# Patient Record
Sex: FEMALE | Race: WHITE | NOT HISPANIC OR LATINO | Employment: STUDENT | URBAN - METROPOLITAN AREA
[De-identification: names, ages, dates, MRNs, and addresses within clinical notes are randomized per-mention and may not be internally consistent; named-entity substitution may affect disease eponyms.]

---

## 2024-03-15 NOTE — PROGRESS NOTES
Assessment/Plan:    IUD removed. Pt has already started PNV. Will call with pos PT.   Preconception counseling done. Recommended monthly SBE and annual CBE. ASCCP guidelines reviewed and no pap collected today. Reviewed diet/activity recommendations. Return to the office in one year for routine annual gyn exam or sooner PRN.    Diagnoses and all orders for this visit:    Encounter for gynecological examination (general) (routine) without abnormal findings    Other orders  -     Iud removal        Subjective:      Patient ID: Sherrie Saucedo is a 29 y.o. female.    This patient presents for routine annual gyn exam.  She denies acute gyn complaints. She had Kyleena IUD placed 4/12/21 at [facility]. At last visit, strings were cut because her partner was getting cut by strings. She was made aware that removal would be more difficult. TVU at that time showed correct placement. Today, pt states she would like IUD removed as she and her  would like to start a family.   She denies breast concerns, abn discharge, bowel/bladder dysfunction, depression/anxiety.   Last pap normal 3/29/22.  + gardasil          The following portions of the patient's history were reviewed and updated as appropriate: allergies, current medications, past family history, past medical history, past social history, past surgical history and problem list.    Review of Systems   Constitutional: Negative.    HENT: Negative.     Respiratory: Negative.     Cardiovascular: Negative.    Gastrointestinal: Negative.    Endocrine: Negative.    Genitourinary:  Negative for difficulty urinating, dyspareunia, dysuria, frequency, menstrual problem, pelvic pain, urgency, vaginal bleeding, vaginal discharge and vaginal pain.   Musculoskeletal: Negative.    Skin: Negative.    Neurological: Negative.    Psychiatric/Behavioral: Negative.           Objective:      /70   Pulse 97   Wt 61.1 kg (134 lb 9.6 oz)   LMP 03/04/2024   BMI 24.62 kg/m²          Physical  Exam  Vitals and nursing note reviewed. Exam conducted with a chaperone present.   Constitutional:       Appearance: Normal appearance. She is well-developed.   HENT:      Head: Normocephalic and atraumatic.   Neck:      Thyroid: No thyroid mass or thyromegaly.   Cardiovascular:      Rate and Rhythm: Normal rate and regular rhythm.      Heart sounds: Normal heart sounds.   Pulmonary:      Effort: Pulmonary effort is normal.      Breath sounds: Normal breath sounds.   Chest:   Breasts:     Breasts are symmetrical.      Right: No inverted nipple, mass, nipple discharge, skin change or tenderness.      Left: No inverted nipple, mass, nipple discharge, skin change or tenderness.   Abdominal:      General: Bowel sounds are normal.      Palpations: Abdomen is soft.      Tenderness: There is no abdominal tenderness.      Hernia: There is no hernia in the left inguinal area or right inguinal area.   Genitourinary:     General: Normal vulva.      Exam position: Supine.      Pubic Area: No rash.       Labia:         Right: No rash, tenderness, lesion or injury.         Left: No rash, tenderness, lesion or injury.       Urethra: No prolapse, urethral pain, urethral swelling or urethral lesion.      Vagina: Normal. No signs of injury and foreign body. No vaginal discharge, erythema, tenderness, bleeding, lesions or prolapsed vaginal walls.      Cervix: No cervical motion tenderness, discharge, friability, lesion, erythema, cervical bleeding or eversion.      Uterus: Not deviated, not enlarged, not fixed, not tender and no uterine prolapse.       Adnexa:         Right: No mass, tenderness or fullness.          Left: No mass, tenderness or fullness.        Rectum: No external hemorrhoid.      Comments: Urethra normal without lesions  No bladder tenderness  Musculoskeletal:         General: Normal range of motion.      Cervical back: Normal range of motion and neck supple.   Lymphadenopathy:      Lower Body: No right inguinal  adenopathy. No left inguinal adenopathy.   Skin:     General: Skin is warm and dry.   Neurological:      Mental Status: She is alert and oriented to person, place, and time.   Psychiatric:         Speech: Speech normal.         Behavior: Behavior normal. Behavior is cooperative.       Iud removal    Date/Time: 3/22/2024 8:30 AM    Performed by: BRIONNA Ramsey  Authorized by: BRIONNA Ramsey  Universal Protocol:  Consent: Verbal consent obtained. Written consent obtained.  Risks and benefits: risks, benefits and alternatives were discussed (infection, bleeding, pain, injury to surrounding structures, failure to remove)  Timeout called at: 3/22/2024 8:30 AM.  Patient understanding: patient states understanding of the procedure being performed  Patient consent: the patient's understanding of the procedure matches consent given  Procedure consent: procedure consent matches procedure scheduled  Relevant documents: relevant documents present and verified  Required items: required blood products, implants, devices, and special equipment available  Patient identity confirmed: verbally with patient    Procedure:     Removed with no complications: yes      Other reason for removal:  Would like to start family

## 2024-03-22 ENCOUNTER — ANNUAL EXAM (OUTPATIENT)
Dept: GYNECOLOGY | Facility: CLINIC | Age: 29
End: 2024-03-22
Payer: COMMERCIAL

## 2024-03-22 VITALS
WEIGHT: 134.6 LBS | DIASTOLIC BLOOD PRESSURE: 70 MMHG | HEART RATE: 97 BPM | BODY MASS INDEX: 24.62 KG/M2 | SYSTOLIC BLOOD PRESSURE: 118 MMHG

## 2024-03-22 DIAGNOSIS — Z30.432 ENCOUNTER FOR IUD REMOVAL: ICD-10-CM

## 2024-03-22 DIAGNOSIS — Z01.419 ENCOUNTER FOR GYNECOLOGICAL EXAMINATION (GENERAL) (ROUTINE) WITHOUT ABNORMAL FINDINGS: Primary | ICD-10-CM

## 2024-03-22 PROCEDURE — S0612 ANNUAL GYNECOLOGICAL EXAMINA: HCPCS | Performed by: OBSTETRICS & GYNECOLOGY

## 2024-03-22 PROCEDURE — 58301 REMOVE INTRAUTERINE DEVICE: CPT | Performed by: OBSTETRICS & GYNECOLOGY

## 2024-06-15 DIAGNOSIS — Z00.6 ENCOUNTER FOR EXAMINATION FOR NORMAL COMPARISON OR CONTROL IN CLINICAL RESEARCH PROGRAM: ICD-10-CM

## 2024-06-22 ENCOUNTER — APPOINTMENT (OUTPATIENT)
Dept: LAB | Facility: CLINIC | Age: 29
End: 2024-06-22

## 2024-06-22 DIAGNOSIS — Z00.6 ENCOUNTER FOR EXAMINATION FOR NORMAL COMPARISON OR CONTROL IN CLINICAL RESEARCH PROGRAM: ICD-10-CM

## 2024-06-22 PROCEDURE — 36415 COLL VENOUS BLD VENIPUNCTURE: CPT

## 2024-06-26 ENCOUNTER — TELEPHONE (OUTPATIENT)
Dept: FAMILY MEDICINE CLINIC | Facility: CLINIC | Age: 29
End: 2024-06-26

## 2024-06-30 NOTE — TELEPHONE ENCOUNTER
06/30/24 12:37 AM        The office's request has been received, reviewed, and the patient chart updated. The PCP has successfully been removed with a patient attribution note. This message will now be completed.        Thank you  Hilario Can

## 2024-07-05 LAB
APOB+LDLR+PCSK9 GENE MUT ANL BLD/T: NOT DETECTED
BRCA1+BRCA2 DEL+DUP + FULL MUT ANL BLD/T: NOT DETECTED
MLH1+MSH2+MSH6+PMS2 GN DEL+DUP+FUL M: NOT DETECTED

## 2024-08-21 ENCOUNTER — ULTRASOUND (OUTPATIENT)
Age: 29
End: 2024-08-21
Payer: COMMERCIAL

## 2024-08-21 VITALS — WEIGHT: 132 LBS | DIASTOLIC BLOOD PRESSURE: 62 MMHG | SYSTOLIC BLOOD PRESSURE: 118 MMHG | BODY MASS INDEX: 24.14 KG/M2

## 2024-08-21 DIAGNOSIS — N91.1 SECONDARY AMENORRHEA: Primary | ICD-10-CM

## 2024-08-21 PROCEDURE — 76817 TRANSVAGINAL US OBSTETRIC: CPT | Performed by: OBSTETRICS & GYNECOLOGY

## 2024-08-21 PROCEDURE — 99214 OFFICE O/P EST MOD 30 MIN: CPT | Performed by: OBSTETRICS & GYNECOLOGY

## 2024-08-21 NOTE — PROGRESS NOTES
Assessment/Plan:      Secondary amenorrhea  -     Ambulatory Referral to Maternal Fetal Medicine; Future  -     AMB US OB < 14 weeks single or first gestation level 1  -     SIUP @ 8w 4d by LMP EDC 3/29/2025        Santos Saucedo is a 29 y.o.  LMP 2024 who presents with amenorrhea and + urine hCG.  Some mild nausea.  No VB.    Cycle length: regular 28d.  Pregnancy testing: at home.  Pregnancy imaging: not done.  Blood type: B positive.  Other lab results: none.    Past Medical History:   Diagnosis Date    Abnormal Pap smear of cervix 3/29/21    HPV OTHER-positive, 16/18 negative    Asthma        Family History   Problem Relation Age of Onset    Vaginal cancer Paternal Aunt     Cancer Maternal Grandmother         Lung cancer       The following portions of the patient's history were reviewed and updated as appropriate: allergies, current medications, past family history, past medical history, past social history, past surgical history, and problem list.    Review of Systems  Pertinent items are noted in HPI.     Objective      /62   Wt 59.9 kg (132 lb)   LMP 2024   BMI 24.14 kg/m²     General: alert and oriented, in no acute distress   Heart: regular rate and rhythm   Lungs: Effort normal   Abdomen: soft, non-tender, without masses or organomegaly   Vulva: normal                     Imaging:  see report under OB Procedures

## 2024-08-22 ENCOUNTER — TELEPHONE (OUTPATIENT)
Age: 29
End: 2024-08-22

## 2024-08-22 NOTE — PROGRESS NOTES
Ultrasound Probe Disinfection    A transvaginal ultrasound was performed.   Prior to use, disinfection was performed with High Level Disinfection Process (Trophon)  Probe serial number  [id number]  was used    María Pantoja MA  08/22/24  2:30 PM

## 2024-09-05 NOTE — PROGRESS NOTES
OB INTAKE INTERVIEW 2024    Patient is 29 y.o. who presents for OB intake at 10w6d.  She is accompanied by her spouse during this encounter.  The father of her baby (Sathish Saucedo) is involved in the pregnancy.      Patient's last menstrual period was 2024.  Ultrasound: Measured 9 weeks 0 days on 2024  Estimated Date of Delivery: 3/29/25 confirmed by dating ultrasound.    Signs/Symptoms of Pregnancy  Current pregnancy symptoms: fatigue and nausea  Headaches: YES - relieved w/rest  Cramping: YES - occasional  Spotting: no  PICA cravings: no    Diabetes:  Body mass index is 24.62 kg/m².  If patient has 1 or more, please order early 1 hour GTT  History of GDM: no  BMI >35 no  History of PCOS or current metformin use: no  History of LGA/macrosomic infant (4000g/9lbs): no    If patient has 2 or more, please order early 1 hour GTT  BMI>30 no  AMA: no  First degree relative with type 2 diabetes: no  History of chronic HTN, hyperlipidemia, elevated A1C: no  High risk race (, , ,  or ): no    Hypertension: if you answer yes to any of the following, please order baseline preeclampsia labs (cbc, comprehensive metabolic panel, urine protein creatinine ratio, uric acid)  History of of chronic HTN: no  History of gestational HTN: no  History of preeclampsia, eclampsia, or HELLP syndrome: no  History of diabetes: no  History of lupus, autoimmune disease, kidney disease: no    Thyroid: if yes order TSH with reflex T4  History of thyroid disease: no    Bleeding Disorder or Hx of DVT - patient or first degree relative with history of. Order the following if not done previously.   (Factor V, antithrombin III, prothrombin gene mutation, protein C and S Ag, lupus anticoagulant, anticardiolipin, beta-2 glycoprotein):   no    OB/GYN:  History of abnormal pap smear: YES - ASCUS/HPV in the past  Date of last pap smear: 2022  History of HPV: YES - in the  past  History of Herpes/HSV: no  History of other STI: (gonorrhea, chlamydia, trich) no  History of prior : no  History of prior : no  History of  delivery prior to 36 weeks 6 days: no  History of blood transfusion: no  Ok for blood transfusion: YES    Substance screening:   History of tobacco use: no  Currently using tobacco: no  Currently using alcohol: no  Presently using drugs: no  Past drug use:  no  IV drug use: no  Partner drug use: no  Parent/Family drug use: no  Substance Use Screen: Level  No risk    MRSA Screening:   Does the pt have a hx of MRSA? no    Mental Health:  Hx of/or current dx of depression: no  Hx of/or current dx of anxiety: no  Medications: no   EPDS Screen:  Negative / score: 2    Immunizations:  Discussed Tdap vaccine:  YES  Discussed COVID Vaccine:  YES -had in the past    Genetic/MFM:  Do you or your partner have a history of any of the following in yourselves or first degree relatives?  Cystic fibrosis: no  Spinal muscular atrophy: no  Hemoglobinopathy/Sickle Cell/Thalassemia: no  Fragile X Intellectual Disability: no    If yes, discuss Carrier Screening and recommend consultation with MFM/Genetic Counseling and place specific [facility] Referral placed    Discussed Carrier Screening being completed once in a lifetime as a standard of care lab test. Place orders for Cystic Fibrosis Gene Test ([id number]) and Spinal Muscular Atrophy DNA ([id number]).  Patient was informed that prior authorization needs to be completed for these tests and this may take 7-10 business days.  Patient does desire testing for Cystic Fibrosis and Spinal Muscular Atrophy.    ACOG Patient Education Cystic Fibrosis and Spinal Muscular Atrophy prenatal screening given.    Appointment for Nuchal Translucency Ultrasound at [facility] is scheduled for 24.    Interview education:  [facility] Pregnancy Essentials Book reviewed, discussed and attached to their AVS: YES     Nurse/Family Partnership-patient may qualify  NO; referral placed NO     Prenatal lab work scripts: YES    Extra labs ordered: Cystic Fibrosis gene test, Spinal muscular atrophy DNA, and Hgb Fractionation Cascade    Aspirin/Preeclampsia Screen    Risk Level Risk Factor Recommendation   LOW Prior Uncomplicated full-term delivery: no No Aspirin recommendation        MODERATE Nulliparity YES Recommend low-dose aspirin if     BMI>30 no 2 or more moderate risk factors    Family History Preeclampsia (mother/sister) no     35yr old or greater: no      or Low Socioeconomic: no     IVF Pregnancy:  no     Personal History Risks (low birth weight, prior adverse preg outcome, >10yr preg interval): no         HIGH History of Preeclampsia: no Recommend low-dose aspirin if     Multifetal gestation: no 1 or more high risk factors    Chronic HTN: no     Type 1 or 2 Diabetes: no     Renal Disease: no     Autoimmune Disease:  no      Contraindications to ASA therapy:  NSAID/ ASA allergy: no  Nasal polyps: no  Asthma with history of ASA induced bronchospasm: no    Relative contraindications:  History of GI bleed: no  Active peptic ulcer disease: no  Severe hepatic dysfunction: no    Patient does not meet recommendation to take ASA 162mg during this pregnancy from 12-36 wks to lower her risk of preeclampsia.      The patient has a history now or in prior pregnancy notable for:  pt adopted, hx asthma       Details that I feel the provider should be aware of: Sherrie was seen here in office for her OB Intake visit, Hx obtained, c/o occasional nausea, discussed B6/Unisom dosing, also reviewed avoiding an empty belly.  Reviewed medications safe to take in pregnancy.  [facility] Essentials packet/link reviewed. [facility] Baby and Me classes reviewed and how to register for classes     PN1 visit scheduled. The patient was oriented to our practice, the navigator role, reviewed delivering physicians and [facility] for delivery. All questions were answered.    Interviewed by:  Kim Nicolas RN

## 2024-09-05 NOTE — PATIENT INSTRUCTIONS
Congratulations on your pregnancy!  We thank you for allowing us to participate in your care.    NEXT STEPS    Go to the lab to have your prenatal bloodwork competed if you have not already done so.  There is a listing of [facility] and locations in your prenatal folder. You may also visit [email or web address] or call [phone].   Please be aware that some insurance companies may require you to go to a specific lab (ex. [employer] or [employer]). You can verify this by contacting your insurance company.   If you have decided to be screened for CF and SMA genetic testing, these tests require prior authorization and scheduling.  Prior Authorization is not a guarantee of payment. There may be out of pocket expenses that includes copays, deductibles and or coinsurance for your individual plan.  Please call [phone] if our team has not contacted you in 7 business days.  Please have your blood work completed prior to you next prenatal visit.    If you have decided to have genetic testing done at Maternal Fetal Medicine, that will be scheduled by [facility]. You may have already scheduled this appointment.  If not, please call their office to schedule this appointment.  Based on the referral placed by our office, they will know how to schedule you appropriately.    Contact information for Maternal Fetal Medicine is located in your prenatal folder. The main phone number to their office is [phone]..     Return to our office for your first routine prenatal visit.     Warning Signs During Pregnancy - If you experience any problems or concerns, call the office directly.  The list below includes warning signs your providers would like you to be aware of.  If you experience any of these at any time during your pregnancy, please call us as soon as possible.    Vaginal bleeding   Sharp abdominal pain that does not go away   Fever (more than 100.4?F and is not relieved with Tylenol)   Persistent vomiting lasting greater than 24  hours   Chest pain/Shortness of breath   Pain or burning when you urinate     Call the OFFICE [phone] for any questions/emergencies.  At night or on the weekend, calls go through a triage service, please indicate it is an emergency and the DOCTOR on call will be paged.    Remember to only use MyChart for non-urgent concerns or questions.    Our doctors deliver at [facility] in [place]. The address is provided below.     Critical access hospital  [address] [place]  [address]     Please click on the links below to review our Pregnancy Essential Guide.    [facility] Pregnancy Essentials Guide  [facility] Women's Health ([email or web address])     [facility] - Virtual Tour ([email or web address])      Click on the link below to review [facility] Lab locations.  [facility] Lab Locations    [employer] Apps resource  [employer] is a tool to connect you to community resources you may need.      Thank you,   Kim HALEY, RN  OB Nurse Navigator

## 2024-09-06 ENCOUNTER — INITIAL PRENATAL (OUTPATIENT)
Age: 29
End: 2024-09-06

## 2024-09-06 VITALS
DIASTOLIC BLOOD PRESSURE: 64 MMHG | WEIGHT: 134.6 LBS | SYSTOLIC BLOOD PRESSURE: 112 MMHG | BODY MASS INDEX: 24.77 KG/M2 | HEIGHT: 62 IN

## 2024-09-06 DIAGNOSIS — Z31.430 ENCOUNTER OF FEMALE FOR TESTING FOR GENETIC DISEASE CARRIER STATUS FOR PROCREATIVE MANAGEMENT: ICD-10-CM

## 2024-09-06 DIAGNOSIS — Z34.01 ENCOUNTER FOR SUPERVISION OF NORMAL FIRST PREGNANCY IN FIRST TRIMESTER: Primary | ICD-10-CM

## 2024-09-06 PROCEDURE — OBC

## 2024-09-13 ENCOUNTER — APPOINTMENT (OUTPATIENT)
Dept: LAB | Facility: CLINIC | Age: 29
End: 2024-09-13
Payer: COMMERCIAL

## 2024-09-13 DIAGNOSIS — Z31.430 ENCOUNTER OF FEMALE FOR TESTING FOR GENETIC DISEASE CARRIER STATUS FOR PROCREATIVE MANAGEMENT: ICD-10-CM

## 2024-09-13 DIAGNOSIS — Z34.01 ENCOUNTER FOR SUPERVISION OF NORMAL FIRST PREGNANCY IN FIRST TRIMESTER: ICD-10-CM

## 2024-09-13 LAB
ABO GROUP BLD: NORMAL
BASOPHILS # BLD AUTO: 0.03 THOUSANDS/ΜL (ref 0–0.1)
BASOPHILS NFR BLD AUTO: 0 % (ref 0–1)
BILIRUB UR QL STRIP: NEGATIVE
BLD GP AB SCN SERPL QL: NEGATIVE
CLARITY UR: CLEAR
COLOR UR: NORMAL
EOSINOPHIL # BLD AUTO: 0.11 THOUSAND/ΜL (ref 0–0.61)
EOSINOPHIL NFR BLD AUTO: 1 % (ref 0–6)
ERYTHROCYTE [DISTWIDTH] IN BLOOD BY AUTOMATED COUNT: 12.9 % (ref 11.6–15.1)
GLUCOSE UR STRIP-MCNC: NEGATIVE MG/DL
HBV SURFACE AB SER-ACNC: <3 MIU/ML
HBV SURFACE AG SER QL: NORMAL
HCT VFR BLD AUTO: 33.9 % (ref 34.8–46.1)
HCV AB SER QL: NORMAL
HGB BLD-MCNC: 11.6 G/DL (ref 11.5–15.4)
HGB UR QL STRIP.AUTO: NEGATIVE
HIV 1+2 AB+HIV1 P24 AG SERPL QL IA: NORMAL
HIV 2 AB SERPL QL IA: NORMAL
HIV1 AB SERPL QL IA: NORMAL
HIV1 P24 AG SERPL QL IA: NORMAL
IMM GRANULOCYTES # BLD AUTO: 0.02 THOUSAND/UL (ref 0–0.2)
IMM GRANULOCYTES NFR BLD AUTO: 0 % (ref 0–2)
KETONES UR STRIP-MCNC: NEGATIVE MG/DL
LEUKOCYTE ESTERASE UR QL STRIP: NEGATIVE
LYMPHOCYTES # BLD AUTO: 2.47 THOUSANDS/ΜL (ref 0.6–4.47)
LYMPHOCYTES NFR BLD AUTO: 30 % (ref 14–44)
MCH RBC QN AUTO: 30.1 PG (ref 26.8–34.3)
MCHC RBC AUTO-ENTMCNC: 34.2 G/DL (ref 31.4–37.4)
MCV RBC AUTO: 88 FL (ref 82–98)
MONOCYTES # BLD AUTO: 0.31 THOUSAND/ΜL (ref 0.17–1.22)
MONOCYTES NFR BLD AUTO: 4 % (ref 4–12)
NEUTROPHILS # BLD AUTO: 5.27 THOUSANDS/ΜL (ref 1.85–7.62)
NEUTS SEG NFR BLD AUTO: 65 % (ref 43–75)
NITRITE UR QL STRIP: NEGATIVE
NRBC BLD AUTO-RTO: 0 /100 WBCS
PH UR STRIP.AUTO: 6.5 [PH]
PLATELET # BLD AUTO: 362 THOUSANDS/UL (ref 149–390)
PMV BLD AUTO: 9 FL (ref 8.9–12.7)
PROT UR STRIP-MCNC: NEGATIVE MG/DL
RBC # BLD AUTO: 3.86 MILLION/UL (ref 3.81–5.12)
RH BLD: POSITIVE
RUBV IGG SERPL IA-ACNC: 16.7 IU/ML
SP GR UR STRIP.AUTO: 1.01 (ref 1–1.03)
SPECIMEN EXPIRATION DATE: NORMAL
TREPONEMA PALLIDUM IGG+IGM AB [PRESENCE] IN SERUM OR PLASMA BY IMMUNOASSAY: NORMAL
UROBILINOGEN UR STRIP-ACNC: <2 MG/DL
WBC # BLD AUTO: 8.21 THOUSAND/UL (ref 4.31–10.16)

## 2024-09-13 PROCEDURE — 86900 BLOOD TYPING SEROLOGIC ABO: CPT

## 2024-09-13 PROCEDURE — 86803 HEPATITIS C AB TEST: CPT

## 2024-09-13 PROCEDURE — 86850 RBC ANTIBODY SCREEN: CPT

## 2024-09-13 PROCEDURE — 83020 HEMOGLOBIN ELECTROPHORESIS: CPT

## 2024-09-13 PROCEDURE — 81220 CFTR GENE COM VARIANTS: CPT

## 2024-09-13 PROCEDURE — 87389 HIV-1 AG W/HIV-1&-2 AB AG IA: CPT

## 2024-09-13 PROCEDURE — 87340 HEPATITIS B SURFACE AG IA: CPT

## 2024-09-13 PROCEDURE — 85025 COMPLETE CBC W/AUTO DIFF WBC: CPT

## 2024-09-13 PROCEDURE — 86780 TREPONEMA PALLIDUM: CPT

## 2024-09-13 PROCEDURE — 36415 COLL VENOUS BLD VENIPUNCTURE: CPT

## 2024-09-13 PROCEDURE — 87086 URINE CULTURE/COLONY COUNT: CPT

## 2024-09-13 PROCEDURE — 86901 BLOOD TYPING SEROLOGIC RH(D): CPT

## 2024-09-13 PROCEDURE — 86706 HEP B SURFACE ANTIBODY: CPT

## 2024-09-13 PROCEDURE — 81329 SMN1 GENE DOS/DELETION ALYS: CPT

## 2024-09-13 PROCEDURE — 81003 URINALYSIS AUTO W/O SCOPE: CPT

## 2024-09-13 PROCEDURE — 86762 RUBELLA ANTIBODY: CPT

## 2024-09-14 LAB — BACTERIA UR CULT: NORMAL

## 2024-09-16 NOTE — PATIENT INSTRUCTIONS
You elected to have non-invasive prenatal screening (NIPS). This involves a blood test to check for the four most common genetic syndromes (Trisomy 21, Trisomy 13, Trisomy 18, and sex chromosome abnormalities). It also MAY report the biologic sex of the fetus if you opted to learn this information. You can call our office to verbally review results to avoid inadvertently learning this information via [employer], if desired. Results will be visible in your [employer] portal 5-7 business days from when the test is drawn. Please follow all instructions regarding insurance cost/coverage provided to you today. Please contact our office with any concerns or questions. You will need spina bifida screening (called MSAFP) for the baby beginning at 15 weeks gestation, which will be ordered by your obstetrician's office. This test allows for earlier detection of spina bifida than is possible by ultrasound, and is advised in all pregnancies.            Thank you for choosing us for your  care today.  If you have any questions about your ultrasound or care, please do not hesitate to contact us or your primary obstetrician.        Some general instructions for your pregnancy are:    Exercise: Aim for 150 minutes per week of regular exercise.  Walking is great!  Nutrition: Choose healthy sources of calcium, iron, and protein.  Avoid ultraprocessed foods and added sugar.  Learn about Preeclampsia: preeclampsia is a common, potentially serious high blood pressure complication in pregnancy.  A blood pressure of 140mmHg (systolic or top number) or 90mmHg (diastolic or bottom number) should be evaluated by your doctor.  Aspirin is sometimes prescribed in early pregnancy to prevent preeclampsia in women with risk factors - ask your obstetrician if you should be on this medication.  For more resources, visit:  [email or web address].[email or web address]  If you smoke, please try to quit completely but also try to reduce your  smoking by as much as possible (as soon as possible).  Do not vape.  Please also avoid cannabis products.  Other warning signs to watch out for in pregnancy or postpartum: chest pain, obstructed breathing or shortness of breath, seizures, thoughts of hurting yourself or your baby, bleeding, a painful or swollen leg, fever, or headache (see [facility] POST-BIRTH Warning Signs campaign).  If these happen call 911.  Itching is also not normal in pregnancy and if you experience this, especially over your hands and feet, potentially worse at night, notify your doctors.

## 2024-09-17 ENCOUNTER — ROUTINE PRENATAL (OUTPATIENT)
Dept: PERINATAL CARE | Facility: CLINIC | Age: 29
End: 2024-09-17
Payer: COMMERCIAL

## 2024-09-17 VITALS
SYSTOLIC BLOOD PRESSURE: 108 MMHG | BODY MASS INDEX: 24.92 KG/M2 | WEIGHT: 135.4 LBS | HEIGHT: 62 IN | HEART RATE: 92 BPM | DIASTOLIC BLOOD PRESSURE: 64 MMHG

## 2024-09-17 DIAGNOSIS — Z33.1 PREGNANT STATE, INCIDENTAL: ICD-10-CM

## 2024-09-17 DIAGNOSIS — Z3A.12 12 WEEKS GESTATION OF PREGNANCY: Primary | ICD-10-CM

## 2024-09-17 DIAGNOSIS — Z36.9 UNSPECIFIED ANTENATAL SCREENING: ICD-10-CM

## 2024-09-17 DIAGNOSIS — O36.80X0 ENCOUNTER TO DETERMINE FETAL VIABILITY OF PREGNANCY, SINGLE OR UNSPECIFIED FETUS: ICD-10-CM

## 2024-09-17 DIAGNOSIS — N91.1 SECONDARY AMENORRHEA: ICD-10-CM

## 2024-09-17 DIAGNOSIS — Z36.82 ENCOUNTER FOR NUCHAL TRANSLUCENCY TESTING: ICD-10-CM

## 2024-09-17 LAB
HGB A MFR BLD: 2.7 % (ref 1.8–3.2)
HGB A MFR BLD: 97.3 % (ref 96.4–98.8)
HGB F MFR BLD: 0 % (ref 0–2)
HGB FRACT BLD-IMP: NORMAL
HGB S MFR BLD: 0 %

## 2024-09-17 PROCEDURE — 76801 OB US < 14 WKS SINGLE FETUS: CPT | Performed by: OBSTETRICS & GYNECOLOGY

## 2024-09-17 PROCEDURE — 76813 OB US NUCHAL MEAS 1 GEST: CPT | Performed by: OBSTETRICS & GYNECOLOGY

## 2024-09-17 PROCEDURE — 99203 OFFICE O/P NEW LOW 30 MIN: CPT | Performed by: OBSTETRICS & GYNECOLOGY

## 2024-09-17 PROCEDURE — 36415 COLL VENOUS BLD VENIPUNCTURE: CPT | Performed by: OBSTETRICS & GYNECOLOGY

## 2024-09-17 NOTE — PROGRESS NOTES
Patient chose to have [facility] [id number] Non-Invasive Prenatal Screen [id number] PLUS w/ SCA, WITH fetal sex.  Patient choose to be billed through insurance.     Patient given brochure and is aware [facility] will contact patient's insurance and coordinate coverage.  Provided [facility] contact information. General inquiries [phone], Cost estimates [phone] and [facility] Billing [phone]. Website [email or web address].     Blood collection tubes labeled with patient identifiers (name, medical record number, and date of birth).     Filled out [facility] order form. Patient chose to have blood drawn in our office at time of visit. NIPS was drawn from left arm with a butterfly needle by Monique BRITT .      If patient chose to have blood work drawn at a [facility] lab we requested patient notify MFM (via phone call or [employer] message) when blood collected so office can follow up on results.       Maternal Fetal Medicine will have results in approximately 5-7 business days and will call patient or notify via [employer].  Patient aware viewing lab result online will reveal fetal sex if ordered.    Patient verbalized understanding of all instructions and no questions at this time.

## 2024-09-17 NOTE — PROGRESS NOTES
"Sherrie presents today for a genetic screening ultrasound.  This is her first pregnancy.  She has no significant medical, surgical, substance use, or family history.  A review of systems is otherwise negative.    We discussed the options for genetic screening, including but not limited to first trimester screening, second trimester screening, combined first and second trimester screening, noninvasive prenatal screening (NIPS) for patients at high risk and diagnostic screening through the use of CVS and amniocentesis. We discussed the risks and benefits of each approach including the sensitivities and false positive rates as well as the difference between a screening test and a diagnostic test. At the conclusion of our discussion the patient elected noninvasive prenatal screening utilizing the MaterniT 21 plus test. The patient had this bloowork drawn in the office.   The results should be available in approximately 7-10 days.    We discussed follow-up in detail and I recommend an anatomy ultrasound be scheduled for 20 weeks gestation.    Thank you very much for allowing us to participate in the care of this very nice patient. Should you have any questions, please do not hesitate to contact me.    Portions of the record may have been created with voice recognition software. Occasional wrong word or \"sound a like\" substitutions may have occurred due to the inherent limitations of voice recognition software. Read the chart carefully and recognize, using context, where substitutions have occurred.  "

## 2024-09-18 LAB
CITATION REF LAB TEST: NORMAL
CLINICAL INFO: NORMAL
ETHNIC BACKGROUND STATED: NORMAL
GENE DIS ANL CARRIER INTERP-IMP: NORMAL
GENE MUT TESTED BLD/T: NORMAL
LAB DIRECTOR NAME PROVIDER: NORMAL
REASON FOR REFERRAL (NARRATIVE): NORMAL
RECOMMENDATION PATIENT DOC-IMP: NORMAL
REF LAB TEST METHOD: NORMAL
SERVICE CMNT-IMP: NORMAL
SMN1 GENE MUT ANL BLD/T: NORMAL
SPECIMEN SOURCE: NORMAL

## 2024-09-20 ENCOUNTER — INITIAL PRENATAL (OUTPATIENT)
Age: 29
End: 2024-09-20
Payer: COMMERCIAL

## 2024-09-20 VITALS — SYSTOLIC BLOOD PRESSURE: 112 MMHG | DIASTOLIC BLOOD PRESSURE: 62 MMHG | WEIGHT: 133.6 LBS | BODY MASS INDEX: 24.44 KG/M2

## 2024-09-20 DIAGNOSIS — Z11.3 SCREENING FOR STD (SEXUALLY TRANSMITTED DISEASE): ICD-10-CM

## 2024-09-20 DIAGNOSIS — Z34.01 ENCOUNTER FOR SUPERVISION OF NORMAL FIRST PREGNANCY IN FIRST TRIMESTER: Primary | ICD-10-CM

## 2024-09-20 LAB
SL AMB  POCT GLUCOSE, UA: NORMAL
SL AMB POCT URINE PROTEIN: NORMAL

## 2024-09-20 PROCEDURE — 87591 N.GONORRHOEAE DNA AMP PROB: CPT | Performed by: OBSTETRICS & GYNECOLOGY

## 2024-09-20 PROCEDURE — PNV: Performed by: OBSTETRICS & GYNECOLOGY

## 2024-09-20 PROCEDURE — 81002 URINALYSIS NONAUTO W/O SCOPE: CPT | Performed by: OBSTETRICS & GYNECOLOGY

## 2024-09-20 PROCEDURE — 87491 CHLMYD TRACH DNA AMP PROBE: CPT | Performed by: OBSTETRICS & GYNECOLOGY

## 2024-09-20 NOTE — PROGRESS NOTES
Assessment:    Pregnancy at 12 and 6/7 weeks      Plan:    Initial labs drawn.  Prenatal vitamins.  Problem list reviewed and updated.  NT sono normal; NIPT pending  AFP @ 16-18 weeks  20 week anatomy sono  Follow up in 4 weeks.  Greater than 50% of 30 min visit spent on counseling and coordination of care.         Subjective     Sherrie Saucedo is being seen today for her first obstetrical visit.  This is a planned pregnancy. She is at 12w6d gestation. Relationship with FOB: spouse, living together. Patient does intend to breast feed. Pregnancy history fully reviewed.    Menstrual History:  OB History          1    Para   0    Term   0       0    AB   0    Living   0         SAB        IAB        Ectopic        Multiple        Live Births                    Patient's last menstrual period was 2024.     Past Medical History:   Diagnosis Date    Abnormal Pap smear of cervix 3/29/21    Pap: 3/15/21 ASCUS / HPV OTHER-positive, 16/18 negative    Asthma     as teen    HPV (human papilloma virus) infection     Pap: 3/15/21 ASCUS/HPV HR+  (16/18 neg)    Varicella     had both chicken pox ans vaccine       Family History   Adopted: Yes   Problem Relation Age of Onset    No Known Problems Mother     No Known Problems Father     No Known Problems Sister     No Known Problems Brother     Cancer Maternal Grandmother         Lung cancer    Vaginal cancer Paternal Aunt     No Known Problems Half-Brother     No Known Problems Half-Brother     No Known Problems Half-Brother     No Known Problems Half-Sister      Past Surgical History:   Procedure Laterality Date    WISDOM TOOTH EXTRACTION      WRIST SURGERY       Current Outpatient Medications on File Prior to Visit   Medication Sig    CHOLINE PO Take by mouth    Prenatal Vit-Fe Fumarate-FA (PRENATAL VITAMIN PO) Take by mouth    ALBUTEROL IN  (Patient not taking: Reported on 3/22/2024)     No current facility-administered medications on file prior to visit.       No  Known Allergies    Social History     Tobacco Use    Smoking status: Never    Smokeless tobacco: Never   Vaping Use    Vaping status: Never Used   Substance Use Topics    Alcohol use: Not Currently    Drug use: Never       The following portions of the patient's history were reviewed and updated as appropriate: allergies, current medications, past family history, past medical history, past social history, past surgical history, and problem list.    Review of Systems  Pertinent items are noted in HPI.      Objective    Vitals:    09/20/24 1501   BP: 112/62          See prenatal physical

## 2024-09-23 LAB
C TRACH DNA SPEC QL NAA+PROBE: NEGATIVE
N GONORRHOEA DNA SPEC QL NAA+PROBE: NEGATIVE

## 2024-09-24 LAB
CFDNA.FET/CFDNA.TOTAL SFR FETUS: NORMAL %
CITATION REF LAB TEST: NORMAL
FET 13+18+21+X+Y ANEUP PLAS.CFDNA: NEGATIVE
FET CHR 21 TS PLAS.CFDNA QL: NEGATIVE
FET CHR 21 TS PLAS.CFDNA QL: NEGATIVE
FET MS X RISK WBC.DNA+CFDNA QL: NOT DETECTED
FET SEX PLAS.CFDNA DOSAGE CFDNA: NORMAL
FET TS 13 RISK PLAS.CFDNA QL: NEGATIVE
FET X + Y ANEUP RISK PLAS.CFDNA SEQ-IMP: NOT DETECTED
GA EST FROM CONCEPTION DATE: NORMAL D
GESTATIONAL AGE > 9:: YES
LAB DIRECTOR NAME PROVIDER: NORMAL
LAB DIRECTOR NAME PROVIDER: NORMAL
LABORATORY COMMENT REPORT: NORMAL
LIMITATIONS OF THE TEST: NORMAL
NEGATIVE PREDICTIVE VALUE: NORMAL
PERFORMANCE CHARACTERISTICS: NORMAL
POSITIVE PREDICTIVE VALUE: NORMAL
REF LAB TEST METHOD: NORMAL
SL AMB NOTE:: NORMAL
TEST PERFORMANCE INFO SPEC: NORMAL

## 2024-09-25 NOTE — RESULT ENCOUNTER NOTE
I have reviewed the results of the NIPS which are low risk.  Please call patient and notify her of these reassuring results if she has not viewed on MyChart. Please ensure she is notified of recommendation of MSAFP to be ordered and followed up through her primary Obstetrician's office.      Thank you, Marcin Salinas MD

## 2024-10-03 LAB
CFTR FULL MUT ANL BLD/T SEQ: NORMAL
CITATION REF LAB TEST: NORMAL
CLINICAL INFO: NORMAL
ETHNIC BACKGROUND STATED: NORMAL
GENE DIS ANL CARRIER INTERP-IMP: NORMAL
INDICATION: NORMAL
LAB DIRECTOR NAME PROVIDER: NORMAL
RECOMMENDATION PATIENT DOC-IMP: NORMAL
REF LAB TEST METHOD: NORMAL
SERVICE CMNT-IMP: NORMAL
SPECIMEN SOURCE: NORMAL

## 2024-10-10 ENCOUNTER — NURSE TRIAGE (OUTPATIENT)
Age: 29
End: 2024-10-10

## 2024-10-11 NOTE — TELEPHONE ENCOUNTER
"Reason for Disposition   SPOTTING (single or brief episode)    Answer Assessment - Initial Assessment Questions  15w6d OB patient reporting 2 instances of discharge yesterday that had a slight brown tint to it. Clear discharge today, no active bleeding or cramping. No other symptoms. Denies any recent IC or pelvic exams. Discussed with patient as symptoms resolved, to monitor and contact the office with any new or worsening symptoms. Reviewed after hours for any concerning symptoms. No further questions.    1. ONSET: \"When did this bleeding start?\"        Yesterday, 2 instances of light brown streaked discharge  2. DESCRIPTION: \"Describe the bleeding that you are having.\" \"How much bleeding is there?\"     - SPOTTING: spotting, or pinkish / brownish mucous discharge; does not fill panti-liner or pad     - MILD:  less than 1 pad / hour; less than patient's usual menstrual bleeding    - MODERATE: 1-2 pads / hour; 1 menstrual cup every 6 hours; small-medium blood clots (e.g., pea, grape, small coin)    - SEVERE: soaking 2 or more pads/hour for 2 or more hours; 1 menstrual cup every 2 hours; bleeding not contained by pads or continuous red blood from vagina; large blood clots (e.g., golf ball, large coin)       None currently  3. ABDOMINAL PAIN SEVERITY: If present, ask: \"How bad is it?\"  (e.g., Scale 1-10; mild, moderate, or severe)    - MILD (1-3): doesn't interfere with normal activities, abdomen soft and not tender to touch     - MODERATE (4-7): interferes with normal activities or awakens from sleep, tender to touch     - SEVERE (8-10): excruciating pain, doubled over, unable to do any normal activities      None  4. PREGNANCY: \"Do you know how many weeks or months pregnant you are?\" \"When was the first day of your last normal menstrual period?\"      15w6d  5. HEMODYNAMIC STATUS: \"Are you weak or feeling lightheaded?\" If Yes, ask: \"Can you stand and walk normally?\"       Denies  6. OTHER SYMPTOMS: \"What other " "symptoms are you having with the bleeding?\" (e.g., passed tissue, vaginal discharge, fever, menstrual-type cramps)      Denies    Answer Assessment - Initial Assessment Questions  1. DISCHARGE: \"Describe the discharge.\" (e.g., white, yellow, green, gray, foamy, cottage cheese-like)      Slight brown tint to her discharge  2. ODOR: \"Is there a bad odor?\"      Denies  3. ONSET: \"When did the discharge begin?\"      Yesterday, 2 occurrences  4. RASH: \"Is there a rash in that area?\" If Yes, ask: \"Describe it.\" (e.g., redness, blisters, sores, bumps)      Denies  5. ABDOMINAL PAIN: \"Are you having any abdominal pain?\" If Yes, ask: \"What does it feel like?\" (e.g., crampy, dull, intermittent, constant)       Denies  6. ABDOMINAL PAIN SEVERITY: If present, ask: \"How bad is it?\"  (e.g., Scale 1-10; mild, moderate, or severe)    - MILD (1-3): doesn't interfere with normal activities, abdomen soft and not tender to touch     - MODERATE (4-7): interferes with normal activities or awakens from sleep, tender to touch     - SEVERE (8-10): excruciating pain, doubled over, unable to do any normal activities      Denies  7. CAUSE: \"What do you think is causing the discharge?\"      Unsure  8. OTHER SYMPTOMS: \"Do you have any other symptoms?\" (e.g., fever, itching, vaginal bleeding, pain with urination)      Denies  9. JAKE: \"What date are you expecting to deliver?\"       03/29/2025  10. PREGNANCY: \"How many weeks pregnant are you?\"        15w6d    Protocols used: Pregnancy - Vaginal Discharge-ADULT-[place], Pregnancy - Vaginal Bleeding Less Than 20 Weeks EGA-ADULT-[place]    ESC sent to Dr Roman.   "

## 2024-10-13 ENCOUNTER — NURSE TRIAGE (OUTPATIENT)
Dept: OTHER | Facility: OTHER | Age: 29
End: 2024-10-13

## 2024-10-13 NOTE — TELEPHONE ENCOUNTER
"Regardin Weeks Pregnant, Brown Discharge  ----- Message from Lety MOBLEY sent at 10/13/2024  7:57 AM EDT -----  \" I am 16 Weeks pregnant, I have been having a Brown Discharge, but this morning there was more then usual.\"    "

## 2024-10-13 NOTE — TELEPHONE ENCOUNTER
Per Dr Nicolas- pt can monitor at home but should schedule an appt for this week to be checked. If there is any yasir bleeding or LOF the pt should call back and be directed to L&D for eval.    Pt called and told of providers instructions. Pt verbalized understanding.    Made appt for tomorrow at 8:30 for patient to be evaluated.  Reason for Disposition  • Normal vaginal discharge in pregnancy    Protocols used: Pregnancy - Vaginal Discharge-Adult-

## 2024-10-13 NOTE — TELEPHONE ENCOUNTER
"Answer Assessment - Initial Assessment Questions  1. DISCHARGE: \"Describe the discharge.\" (e.g., white, yellow, green, gray, foamy, cottage cheese-like)      Brown -has happened before. Friday was first day, didn't happen yesterday , but happened again this morning  2. ODOR: \"Is there a bad odor?\"      denies  3. ONSET: \"When did the discharge begin?\"      friday  4. RASH: \"Is there a rash in that area?\" If Yes, ask: \"Describe it.\" (e.g., redness, blisters, sores, bumps)      denies  5. ABDOMEN PAIN: \"Are you having any abdomen pain?\" If Yes, ask: \"What does it feel like?\" (e.g., crampy, dull, intermittent, constant)       denies  6. ABDOMEN PAIN SEVERITY: If present, ask: \"How bad is it?\"  (e.g., Scale 1-10; mild, moderate, or severe)      --  7. CAUSE: \"What do you think is causing the discharge?\"      unsure  8. OTHER SYMPTOMS: \"Do you have any other symptoms?\" (e.g., fever, itching, vaginal bleeding, pain with urination)      denies  9. JAKE: \"What date are you expecting to deliver?\"       3/29/25  10. PREGNANCY: \"How many weeks pregnant are you?\"        16 weeks    Protocols used: Pregnancy - Vaginal Discharge-Adult-AH    "

## 2024-10-14 ENCOUNTER — ROUTINE PRENATAL (OUTPATIENT)
Age: 29
End: 2024-10-14
Payer: COMMERCIAL

## 2024-10-14 VITALS — WEIGHT: 137.4 LBS | SYSTOLIC BLOOD PRESSURE: 114 MMHG | BODY MASS INDEX: 25.13 KG/M2 | DIASTOLIC BLOOD PRESSURE: 72 MMHG

## 2024-10-14 DIAGNOSIS — Z34.02 ENCOUNTER FOR SUPERVISION OF NORMAL FIRST PREGNANCY IN SECOND TRIMESTER: Primary | ICD-10-CM

## 2024-10-14 DIAGNOSIS — Z36.9 ENCOUNTER FOR ANTENATAL SCREENING: ICD-10-CM

## 2024-10-14 DIAGNOSIS — Z33.1 PREGNANT STATE, INCIDENTAL: ICD-10-CM

## 2024-10-14 DIAGNOSIS — Z23 FLU VACCINE NEED: ICD-10-CM

## 2024-10-14 DIAGNOSIS — N76.0 BV (BACTERIAL VAGINOSIS): ICD-10-CM

## 2024-10-14 DIAGNOSIS — B96.89 BV (BACTERIAL VAGINOSIS): ICD-10-CM

## 2024-10-14 LAB
BV WHIFF TEST VAG QL: ABNORMAL
CLUE CELLS SPEC QL WET PREP: ABNORMAL
PH SMN: ABNORMAL [PH]
SL AMB  POCT GLUCOSE, UA: NEGATIVE
SL AMB POCT URINE PROTEIN: NEGATIVE
SL AMB POCT WET MOUNT: ABNORMAL
T VAGINALIS VAG QL WET PREP: ABNORMAL
YEAST VAG QL WET PREP: ABNORMAL

## 2024-10-14 PROCEDURE — 87210 SMEAR WET MOUNT SALINE/INK: CPT | Performed by: OBSTETRICS & GYNECOLOGY

## 2024-10-14 PROCEDURE — 81002 URINALYSIS NONAUTO W/O SCOPE: CPT | Performed by: OBSTETRICS & GYNECOLOGY

## 2024-10-14 PROCEDURE — PNV: Performed by: OBSTETRICS & GYNECOLOGY

## 2024-10-14 PROCEDURE — 90471 IMMUNIZATION ADMIN: CPT | Performed by: OBSTETRICS & GYNECOLOGY

## 2024-10-14 PROCEDURE — 90656 IIV3 VACC NO PRSV 0.5 ML IM: CPT | Performed by: OBSTETRICS & GYNECOLOGY

## 2024-10-14 RX ORDER — METRONIDAZOLE 7.5 MG/G
1 GEL VAGINAL
Qty: 5 G | Refills: 0 | Status: SHIPPED | OUTPATIENT
Start: 2024-10-14 | End: 2024-10-19

## 2024-10-14 NOTE — PROGRESS NOTES
Pt states had brownish d/c since Friday when she was at work.  Last intercourse >1wk ago.  Denies any cramping.  Not sure if quickening as yet.      Has thierry u/s 11/15.      [place]:  +BV.  Metrogel eRX'd.     PTL, wt gain, pelvic rest for now reviewed.  Bleeding precautions reviewed.  If continues to have bleeding, will send for u/s.

## 2024-10-14 NOTE — PROGRESS NOTES
16w2d  Reports brown discharge on Friday and again yesterday. No vaginal bleeding  No abdominal pain or Loss of fluid  Level II scheduled for 11/15/24    AFP ordered today

## 2024-10-21 ENCOUNTER — TELEPHONE (OUTPATIENT)
Age: 29
End: 2024-10-21

## 2024-10-21 NOTE — TELEPHONE ENCOUNTER
Breast pump order from [facility] has been faxed to office as a form, and scanned into patient chart. Form was filled out and signed.

## 2024-10-31 ENCOUNTER — TELEPHONE (OUTPATIENT)
Dept: OBGYN CLINIC | Facility: CLINIC | Age: 29
End: 2024-10-31

## 2024-10-31 NOTE — TELEPHONE ENCOUNTER
2ND TRIMESTER CHECK-IN CALL     Overall how are you doing? Patient states she is doing well.    Compliant with routine OB care appointments? Yes    Have you completed your 1st trimester labs? Yes    If you had NIPS with MFM, do you have a order for MSAFP? Yes, has appointment for 11/1/24.   Can be completed 15w-22w6d, ideally 16w-18w    Have you seen MFM and do you have your detailed US scheduled? Yes, scheduled 11/15/24.    Pregnancy Education-have you had a chance to review the classes offered and registered? Yes, patient is interested and plans to register for classes.    Additional Notes: offers no c/o

## 2024-10-31 NOTE — TELEPHONE ENCOUNTER
Attempted to contact patient for 2nd Trimester Check-in Call. Pt unavailable. [facility]  [facility].  Left msg to reach out w/questions or concerns.

## 2024-11-01 ENCOUNTER — APPOINTMENT (OUTPATIENT)
Dept: LAB | Facility: CLINIC | Age: 29
End: 2024-11-01
Payer: COMMERCIAL

## 2024-11-01 DIAGNOSIS — Z36.9 ENCOUNTER FOR ANTENATAL SCREENING: ICD-10-CM

## 2024-11-01 DIAGNOSIS — Z33.1 PREGNANT STATE, INCIDENTAL: ICD-10-CM

## 2024-11-01 PROCEDURE — 82105 ALPHA-FETOPROTEIN SERUM: CPT

## 2024-11-01 PROCEDURE — 36415 COLL VENOUS BLD VENIPUNCTURE: CPT

## 2024-11-03 LAB
2ND TRIMESTER 4 SCREEN SERPL-IMP: NORMAL
AFP ADJ MOM SERPL: 1.96
AFP INTERP AMN-IMP: NORMAL
AFP INTERP SERPL-IMP: NORMAL
AFP INTERP SERPL-IMP: NORMAL
AFP SERPL-MCNC: 102.3 NG/ML
AGE AT DELIVERY: 28.1 YR
GA METHOD: NORMAL
GA: 18.9 WEEKS
IDDM PATIENT QL: NO
MULTIPLE PREGNANCY: NO
NEURAL TUBE DEFECT RISK FETUS: 881 %

## 2024-11-11 ENCOUNTER — ROUTINE PRENATAL (OUTPATIENT)
Age: 29
End: 2024-11-11
Payer: COMMERCIAL

## 2024-11-11 VITALS — BODY MASS INDEX: 26.01 KG/M2 | SYSTOLIC BLOOD PRESSURE: 138 MMHG | WEIGHT: 142.2 LBS | DIASTOLIC BLOOD PRESSURE: 74 MMHG

## 2024-11-11 DIAGNOSIS — Z34.02 ENCOUNTER FOR SUPERVISION OF NORMAL FIRST PREGNANCY IN SECOND TRIMESTER: Primary | ICD-10-CM

## 2024-11-11 LAB
SL AMB  POCT GLUCOSE, UA: NORMAL
SL AMB POCT URINE PROTEIN: NORMAL

## 2024-11-11 PROCEDURE — 81002 URINALYSIS NONAUTO W/O SCOPE: CPT | Performed by: OBSTETRICS & GYNECOLOGY

## 2024-11-11 PROCEDURE — PNV: Performed by: OBSTETRICS & GYNECOLOGY

## 2024-11-11 NOTE — PROGRESS NOTES
Feeling well  + FM  No cramping or bleeding  Low risk NIPT and AFP - it's a boy!  Normal NT; level II scheduled   PDS 1

## 2024-11-11 NOTE — PROGRESS NOTES
20w2d  Level II scheduled for 11/15/24   AFP Completed  Denies Leaking of Fluid, vaginal bleeding, contractions  +Fetal Movement  EPDS: 1

## 2024-11-15 ENCOUNTER — ROUTINE PRENATAL (OUTPATIENT)
Dept: PERINATAL CARE | Facility: CLINIC | Age: 29
End: 2024-11-15
Payer: COMMERCIAL

## 2024-11-15 VITALS
BODY MASS INDEX: 26.17 KG/M2 | SYSTOLIC BLOOD PRESSURE: 112 MMHG | WEIGHT: 142.2 LBS | HEIGHT: 62 IN | HEART RATE: 95 BPM | DIASTOLIC BLOOD PRESSURE: 68 MMHG

## 2024-11-15 DIAGNOSIS — Z36.3 ENCOUNTER FOR ANTENATAL SCREENING FOR MALFORMATION USING ULTRASOUND: ICD-10-CM

## 2024-11-15 DIAGNOSIS — Z3A.20 20 WEEKS GESTATION OF PREGNANCY: Primary | ICD-10-CM

## 2024-11-15 DIAGNOSIS — Z36.86 ENCOUNTER FOR ANTENATAL SCREENING FOR CERVICAL LENGTH: ICD-10-CM

## 2024-11-15 PROCEDURE — 76817 TRANSVAGINAL US OBSTETRIC: CPT | Performed by: OBSTETRICS & GYNECOLOGY

## 2024-11-15 PROCEDURE — 76805 OB US >/= 14 WKS SNGL FETUS: CPT | Performed by: OBSTETRICS & GYNECOLOGY

## 2024-11-15 NOTE — PROGRESS NOTES
The patient was seen today for an ultrasound.  Please see ultrasound report (located under Ob Procedures) for additional details.   Thank you very much for allowing us to participate in the care of this very nice patient.  Should you have any questions, please do not hesitate to contact me.     Marcin Salinas MD FACOG  Attending Physician, Maternal-Fetal Medicine  [facility]

## 2024-11-15 NOTE — LETTER
November 15, 2024    Sherrie Saucedo  [address] [facility] [id number]      Dear Ms. Saucedo:    Please see today's notes from you ultrasound.     If you have any questions or concerns, please don't hesitate to call.    Today's ultrasound was interpreted remotely.    I reviewed the patient's genetic screening.  The patient had noninvasive prenatal testing through [facility] using the [id number] plus test.  Her results were normal, placing her in a very low risk category.  The sensitivity of detecting Trisomy 21 with this test is 99.1% with a specificity of 99.9%.  The sensitivity of detecting Trisomy 18 is >99.9% with a specificity of 99.6%.  The sensitivity of detecting Trisomy 13 is 91.7% with a specificity of 99.7%.  Her negative result is very reassuring that the likelihood of her having a fetus with the aforementioned Trisomies is very low.  Maternal serum alpha-fetoprotein screening was normal at 1.96 multiples of the median.    The fetal anatomic survey is complete. There is no sonographic evidence of fetal abnormalities at this time. Good fetal movement and tone are seen. The amniotic fluid volume appears normal. A transvaginal ultrasound was performed to assess the cervix, which was not seen well transabdominally. The cervical length was 4.49 centimeters, which is normal for the current gestational age. There was no significant funneling or dynamic changes appreciated.     Given patient's low risk, I recommend the patient return as clinically indicated.    Thank you very much for allowing us to participate in the care of this very nice patient. Should you have any questions, please do not hesitate to contact our office.    Sincerely,             Marcin Salinas MD        CC: No Recipients

## 2024-12-04 ENCOUNTER — CLINICAL SUPPORT (OUTPATIENT)
Dept: POSTPARTUM | Facility: CLINIC | Age: 29
End: 2024-12-04

## 2024-12-04 DIAGNOSIS — Z32.2 ENCOUNTER FOR CHILDBIRTH INSTRUCTION: Primary | ICD-10-CM

## 2024-12-11 ENCOUNTER — CLINICAL SUPPORT (OUTPATIENT)
Dept: POSTPARTUM | Facility: CLINIC | Age: 29
End: 2024-12-11

## 2024-12-11 DIAGNOSIS — Z32.2 ENCOUNTER FOR CHILDBIRTH INSTRUCTION: Primary | ICD-10-CM

## 2024-12-13 ENCOUNTER — ROUTINE PRENATAL (OUTPATIENT)
Age: 29
End: 2024-12-13
Payer: COMMERCIAL

## 2024-12-13 VITALS — WEIGHT: 150.6 LBS | SYSTOLIC BLOOD PRESSURE: 106 MMHG | BODY MASS INDEX: 27.55 KG/M2 | DIASTOLIC BLOOD PRESSURE: 70 MMHG

## 2024-12-13 DIAGNOSIS — Z11.3 SCREENING FOR STD (SEXUALLY TRANSMITTED DISEASE): ICD-10-CM

## 2024-12-13 DIAGNOSIS — Z34.02 ENCOUNTER FOR SUPERVISION OF NORMAL FIRST PREGNANCY IN SECOND TRIMESTER: Primary | ICD-10-CM

## 2024-12-13 LAB
SL AMB  POCT GLUCOSE, UA: NEGATIVE
SL AMB POCT URINE PROTEIN: NEGATIVE

## 2024-12-13 PROCEDURE — 81002 URINALYSIS NONAUTO W/O SCOPE: CPT | Performed by: OBSTETRICS & GYNECOLOGY

## 2024-12-13 PROCEDURE — PNV: Performed by: OBSTETRICS & GYNECOLOGY

## 2024-12-13 NOTE — PROGRESS NOTES
Baby boy!!    PN visit  24w6d  28wk labs ordered today  Level II completed 11/15/24    Denies loss of fluid, contractions or bleeding  +Fetal movement

## 2024-12-18 ENCOUNTER — CLINICAL SUPPORT (OUTPATIENT)
Dept: POSTPARTUM | Facility: CLINIC | Age: 29
End: 2024-12-18

## 2024-12-18 DIAGNOSIS — Z32.2 ENCOUNTER FOR CHILDBIRTH INSTRUCTION: Primary | ICD-10-CM

## 2024-12-21 ENCOUNTER — APPOINTMENT (OUTPATIENT)
Dept: LAB | Facility: CLINIC | Age: 29
End: 2024-12-21
Payer: COMMERCIAL

## 2024-12-21 DIAGNOSIS — Z34.02 ENCOUNTER FOR SUPERVISION OF NORMAL FIRST PREGNANCY IN SECOND TRIMESTER: ICD-10-CM

## 2024-12-21 DIAGNOSIS — Z11.3 SCREENING FOR STD (SEXUALLY TRANSMITTED DISEASE): ICD-10-CM

## 2024-12-21 LAB
BASOPHILS # BLD AUTO: 0.05 THOUSANDS/ÂΜL (ref 0–0.1)
BASOPHILS NFR BLD AUTO: 0 % (ref 0–1)
EOSINOPHIL # BLD AUTO: 0.11 THOUSAND/ÂΜL (ref 0–0.61)
EOSINOPHIL NFR BLD AUTO: 1 % (ref 0–6)
ERYTHROCYTE [DISTWIDTH] IN BLOOD BY AUTOMATED COUNT: 13.3 % (ref 11.6–15.1)
GLUCOSE 1H P 50 G GLC PO SERPL-MCNC: 107 MG/DL (ref 70–134)
HCT VFR BLD AUTO: 34.4 % (ref 34.8–46.1)
HGB BLD-MCNC: 11.6 G/DL (ref 11.5–15.4)
IMM GRANULOCYTES # BLD AUTO: 0.07 THOUSAND/UL (ref 0–0.2)
IMM GRANULOCYTES NFR BLD AUTO: 1 % (ref 0–2)
LYMPHOCYTES # BLD AUTO: 2.31 THOUSANDS/ÂΜL (ref 0.6–4.47)
LYMPHOCYTES NFR BLD AUTO: 19 % (ref 14–44)
MCH RBC QN AUTO: 30.3 PG (ref 26.8–34.3)
MCHC RBC AUTO-ENTMCNC: 33.7 G/DL (ref 31.4–37.4)
MCV RBC AUTO: 90 FL (ref 82–98)
MONOCYTES # BLD AUTO: 0.44 THOUSAND/ÂΜL (ref 0.17–1.22)
MONOCYTES NFR BLD AUTO: 4 % (ref 4–12)
NEUTROPHILS # BLD AUTO: 9.03 THOUSANDS/ÂΜL (ref 1.85–7.62)
NEUTS SEG NFR BLD AUTO: 75 % (ref 43–75)
NRBC BLD AUTO-RTO: 0 /100 WBCS
PLATELET # BLD AUTO: 334 THOUSANDS/UL (ref 149–390)
PMV BLD AUTO: 8.8 FL (ref 8.9–12.7)
RBC # BLD AUTO: 3.83 MILLION/UL (ref 3.81–5.12)
WBC # BLD AUTO: 12.01 THOUSAND/UL (ref 4.31–10.16)

## 2024-12-21 PROCEDURE — 86780 TREPONEMA PALLIDUM: CPT

## 2024-12-21 PROCEDURE — 36415 COLL VENOUS BLD VENIPUNCTURE: CPT

## 2024-12-21 PROCEDURE — 82950 GLUCOSE TEST: CPT

## 2024-12-21 PROCEDURE — 85025 COMPLETE CBC W/AUTO DIFF WBC: CPT

## 2024-12-22 LAB — TREPONEMA PALLIDUM IGG+IGM AB [PRESENCE] IN SERUM OR PLASMA BY IMMUNOASSAY: NORMAL

## 2024-12-23 ENCOUNTER — RESULTS FOLLOW-UP (OUTPATIENT)
Age: 29
End: 2024-12-23

## 2025-01-02 ENCOUNTER — CLINICAL SUPPORT (OUTPATIENT)
Age: 30
End: 2025-01-02

## 2025-01-02 DIAGNOSIS — Z32.2 ENCOUNTER FOR CHILDBIRTH INSTRUCTION: Primary | ICD-10-CM

## 2025-01-08 ENCOUNTER — ROUTINE PRENATAL (OUTPATIENT)
Age: 30
End: 2025-01-08
Payer: COMMERCIAL

## 2025-01-08 VITALS — BODY MASS INDEX: 28.46 KG/M2 | DIASTOLIC BLOOD PRESSURE: 80 MMHG | SYSTOLIC BLOOD PRESSURE: 130 MMHG | WEIGHT: 155.6 LBS

## 2025-01-08 DIAGNOSIS — Z34.02 ENCOUNTER FOR SUPERVISION OF NORMAL FIRST PREGNANCY IN SECOND TRIMESTER: Primary | ICD-10-CM

## 2025-01-08 LAB
SL AMB  POCT GLUCOSE, UA: NORMAL
SL AMB POCT URINE PROTEIN: NORMAL

## 2025-01-08 PROCEDURE — 81002 URINALYSIS NONAUTO W/O SCOPE: CPT | Performed by: OBSTETRICS & GYNECOLOGY

## 2025-01-08 PROCEDURE — PNV: Performed by: OBSTETRICS & GYNECOLOGY

## 2025-01-08 NOTE — PROGRESS NOTES
PN visit  28w/4d  Denies cramping or spotting  + fetal movement  Yellow folder given   Delivery consent signed  Breastfeeding: yes  Labs completed: yes; passed 1 hour GTT

## 2025-01-08 NOTE — PROGRESS NOTES
Pt reports +FM, denies LOF, VB, or BH ctx.  BV symptoms resolved.      Delivery consent signed today.  Yellow folder given.      1hr wnl.  Increase iron and vit C intake.     PTL, LOVE, wt gain and iron rich food reviewed.        Admission

## 2025-01-18 ENCOUNTER — CLINICAL SUPPORT (OUTPATIENT)
Age: 30
End: 2025-01-18

## 2025-01-18 DIAGNOSIS — Z32.2 ENCOUNTER FOR CHILDBIRTH INSTRUCTION: Primary | ICD-10-CM

## 2025-01-21 ENCOUNTER — CLINICAL SUPPORT (OUTPATIENT)
Dept: POSTPARTUM | Facility: CLINIC | Age: 30
End: 2025-01-21

## 2025-01-21 DIAGNOSIS — Z32.2 ENCOUNTER FOR CHILDBIRTH INSTRUCTION: Primary | ICD-10-CM

## 2025-01-24 ENCOUNTER — ROUTINE PRENATAL (OUTPATIENT)
Age: 30
End: 2025-01-24
Payer: COMMERCIAL

## 2025-01-24 VITALS — SYSTOLIC BLOOD PRESSURE: 112 MMHG | BODY MASS INDEX: 29.04 KG/M2 | DIASTOLIC BLOOD PRESSURE: 64 MMHG | WEIGHT: 158.8 LBS

## 2025-01-24 DIAGNOSIS — Z34.03 ENCOUNTER FOR SUPERVISION OF NORMAL FIRST PREGNANCY IN THIRD TRIMESTER: Primary | ICD-10-CM

## 2025-01-24 DIAGNOSIS — Z23 NEED FOR DIPHTHERIA-TETANUS-PERTUSSIS (TDAP) VACCINE: ICD-10-CM

## 2025-01-24 LAB
SL AMB  POCT GLUCOSE, UA: NORMAL
SL AMB POCT URINE PROTEIN: NORMAL

## 2025-01-24 PROCEDURE — 90715 TDAP VACCINE 7 YRS/> IM: CPT | Performed by: OBSTETRICS & GYNECOLOGY

## 2025-01-24 PROCEDURE — 81002 URINALYSIS NONAUTO W/O SCOPE: CPT | Performed by: OBSTETRICS & GYNECOLOGY

## 2025-01-24 PROCEDURE — PNV: Performed by: OBSTETRICS & GYNECOLOGY

## 2025-01-24 PROCEDURE — 90471 IMMUNIZATION ADMIN: CPT | Performed by: OBSTETRICS & GYNECOLOGY

## 2025-01-24 NOTE — PROGRESS NOTES
PN visit  30w6d  Up to date Flu  Tdap given today  Delivery consent previously signed  Breastfeeding: yes-Has a breast pump  Labs completed: yes; passed 1 hour GTT  Denies Leaking of Fluid, vaginal bleeding, contractions  +Fetal Movement

## 2025-01-25 ENCOUNTER — CLINICAL SUPPORT (OUTPATIENT)
Age: 30
End: 2025-01-25

## 2025-01-25 DIAGNOSIS — Z32.2 ENCOUNTER FOR CHILDBIRTH INSTRUCTION: Primary | ICD-10-CM

## 2025-01-30 ENCOUNTER — TELEPHONE (OUTPATIENT)
Dept: OBGYN CLINIC | Facility: CLINIC | Age: 30
End: 2025-01-30

## 2025-01-30 NOTE — TELEPHONE ENCOUNTER
3RD TRIMESTER CHECK-IN CALL      Overall how are you feeling? Patient states she is doing well.    Compliant with routine OB appointments? Yes    Have you completed your 3rd trimester lab work? Yes    Have you reviewed the contents of 3rd trimester folder from office?  Yes    Have you decided on a pediatrician? Yes     If yes, who:  [facility]    Questions on paperwork to go back to office? No, forms have been returned to office.    Questions on the baby birth certificate forms? No    Sent link for the Hospital Readiness Video via [employer]    - - -

## 2025-02-01 ENCOUNTER — HOSPITAL ENCOUNTER (EMERGENCY)
Facility: HOSPITAL | Age: 30
Discharge: HOME | End: 2025-02-01
Attending: EMERGENCY MEDICINE | Admitting: EMERGENCY MEDICINE
Payer: COMMERCIAL

## 2025-02-01 ENCOUNTER — NURSE TRIAGE (OUTPATIENT)
Dept: OTHER | Facility: OTHER | Age: 30
End: 2025-02-01

## 2025-02-01 ENCOUNTER — APPOINTMENT (EMERGENCY)
Dept: RADIOLOGY | Facility: HOSPITAL | Age: 30
End: 2025-02-01
Payer: COMMERCIAL

## 2025-02-01 VITALS
WEIGHT: 158 LBS | HEIGHT: 62 IN | HEART RATE: 123 BPM | RESPIRATION RATE: 18 BRPM | SYSTOLIC BLOOD PRESSURE: 101 MMHG | DIASTOLIC BLOOD PRESSURE: 58 MMHG | OXYGEN SATURATION: 100 % | TEMPERATURE: 97.7 F | BODY MASS INDEX: 29.08 KG/M2

## 2025-02-01 DIAGNOSIS — B34.9 VIRAL SYNDROME: Primary | ICD-10-CM

## 2025-02-01 LAB
2HR DELTA HS TROPONIN: -1 NG/L
ALBUMIN SERPL BCG-MCNC: 3.7 G/DL (ref 3.5–5)
ALP SERPL-CCNC: 88 U/L (ref 34–104)
ALT SERPL W P-5'-P-CCNC: 28 U/L (ref 7–52)
ANION GAP SERPL CALCULATED.3IONS-SCNC: 9 MMOL/L (ref 4–13)
AST SERPL W P-5'-P-CCNC: 25 U/L (ref 13–39)
BASOPHILS # BLD AUTO: 0.03 THOUSANDS/ΜL (ref 0–0.1)
BASOPHILS NFR BLD AUTO: 0 % (ref 0–1)
BILIRUB SERPL-MCNC: 0.25 MG/DL (ref 0.2–1)
BILIRUB UR QL STRIP: NEGATIVE
BUN SERPL-MCNC: 6 MG/DL (ref 5–25)
CALCIUM SERPL-MCNC: 8.6 MG/DL (ref 8.4–10.2)
CARDIAC TROPONIN I PNL SERPL HS: 3 NG/L (ref ?–50)
CARDIAC TROPONIN I PNL SERPL HS: 4 NG/L (ref ?–50)
CHLORIDE SERPL-SCNC: 103 MMOL/L (ref 96–108)
CLARITY UR: CLEAR
CO2 SERPL-SCNC: 22 MMOL/L (ref 21–32)
COLOR UR: COLORLESS
CREAT SERPL-MCNC: 0.55 MG/DL (ref 0.6–1.3)
EOSINOPHIL # BLD AUTO: 0.01 THOUSAND/ΜL (ref 0–0.61)
EOSINOPHIL NFR BLD AUTO: 0 % (ref 0–6)
ERYTHROCYTE [DISTWIDTH] IN BLOOD BY AUTOMATED COUNT: 13.3 % (ref 11.6–15.1)
FLUAV RNA RESP QL NAA+PROBE: NEGATIVE
FLUBV RNA RESP QL NAA+PROBE: NEGATIVE
GFR SERPL CREATININE-BSD FRML MDRD: 128 ML/MIN/1.73SQ M
GLUCOSE SERPL-MCNC: 93 MG/DL (ref 65–140)
GLUCOSE UR STRIP-MCNC: NEGATIVE MG/DL
HCT VFR BLD AUTO: 34.6 % (ref 34.8–46.1)
HGB BLD-MCNC: 11.6 G/DL (ref 11.5–15.4)
HGB UR QL STRIP.AUTO: NEGATIVE
IMM GRANULOCYTES # BLD AUTO: 0.16 THOUSAND/UL (ref 0–0.2)
IMM GRANULOCYTES NFR BLD AUTO: 1 % (ref 0–2)
KETONES UR STRIP-MCNC: NEGATIVE MG/DL
LACTATE SERPL-SCNC: 1 MMOL/L (ref 0.5–2)
LEUKOCYTE ESTERASE UR QL STRIP: NEGATIVE
LYMPHOCYTES # BLD AUTO: 0.58 THOUSANDS/ΜL (ref 0.6–4.47)
LYMPHOCYTES NFR BLD AUTO: 5 % (ref 14–44)
MAGNESIUM SERPL-MCNC: 1.8 MG/DL (ref 1.9–2.7)
MCH RBC QN AUTO: 29.8 PG (ref 26.8–34.3)
MCHC RBC AUTO-ENTMCNC: 33.5 G/DL (ref 31.4–37.4)
MCV RBC AUTO: 89 FL (ref 82–98)
MONOCYTES # BLD AUTO: 0.8 THOUSAND/ΜL (ref 0.17–1.22)
MONOCYTES NFR BLD AUTO: 7 % (ref 4–12)
NEUTROPHILS # BLD AUTO: 9.94 THOUSANDS/ΜL (ref 1.85–7.62)
NEUTS SEG NFR BLD AUTO: 87 % (ref 43–75)
NITRITE UR QL STRIP: NEGATIVE
NRBC BLD AUTO-RTO: 0 /100 WBCS
PH UR STRIP.AUTO: 7 [PH]
PLATELET # BLD AUTO: 238 THOUSANDS/UL (ref 149–390)
PMV BLD AUTO: 8.5 FL (ref 8.9–12.7)
POTASSIUM SERPL-SCNC: 3.7 MMOL/L (ref 3.5–5.3)
PROCALCITONIN SERPL-MCNC: 0.15 NG/ML
PROT SERPL-MCNC: 6.7 G/DL (ref 6.4–8.4)
PROT UR STRIP-MCNC: NEGATIVE MG/DL
RBC # BLD AUTO: 3.89 MILLION/UL (ref 3.81–5.12)
RSV RNA RESP QL NAA+PROBE: NEGATIVE
S PYO DNA THROAT QL NAA+PROBE: NOT DETECTED
SARS-COV-2 RNA RESP QL NAA+PROBE: NEGATIVE
SODIUM SERPL-SCNC: 134 MMOL/L (ref 135–147)
SP GR UR STRIP.AUTO: <1.005 (ref 1–1.03)
UROBILINOGEN UR STRIP-ACNC: <2 MG/DL
WBC # BLD AUTO: 11.52 THOUSAND/UL (ref 4.31–10.16)

## 2025-02-01 PROCEDURE — 87086 URINE CULTURE/COLONY COUNT: CPT | Performed by: EMERGENCY MEDICINE

## 2025-02-01 PROCEDURE — 36415 COLL VENOUS BLD VENIPUNCTURE: CPT | Performed by: EMERGENCY MEDICINE

## 2025-02-01 PROCEDURE — 85025 COMPLETE CBC W/AUTO DIFF WBC: CPT | Performed by: EMERGENCY MEDICINE

## 2025-02-01 PROCEDURE — 87651 STREP A DNA AMP PROBE: CPT | Performed by: PHYSICIAN ASSISTANT

## 2025-02-01 PROCEDURE — 83605 ASSAY OF LACTIC ACID: CPT | Performed by: EMERGENCY MEDICINE

## 2025-02-01 PROCEDURE — 0241U HB NFCT DS VIR RESP RNA 4 TRGT: CPT | Performed by: PHYSICIAN ASSISTANT

## 2025-02-01 PROCEDURE — 93005 ELECTROCARDIOGRAM TRACING: CPT

## 2025-02-01 PROCEDURE — 84484 ASSAY OF TROPONIN QUANT: CPT | Performed by: EMERGENCY MEDICINE

## 2025-02-01 PROCEDURE — 96368 THER/DIAG CONCURRENT INF: CPT

## 2025-02-01 PROCEDURE — 71045 X-RAY EXAM CHEST 1 VIEW: CPT

## 2025-02-01 PROCEDURE — 80053 COMPREHEN METABOLIC PANEL: CPT | Performed by: EMERGENCY MEDICINE

## 2025-02-01 PROCEDURE — 96365 THER/PROPH/DIAG IV INF INIT: CPT

## 2025-02-01 PROCEDURE — 81003 URINALYSIS AUTO W/O SCOPE: CPT | Performed by: EMERGENCY MEDICINE

## 2025-02-01 PROCEDURE — 99284 EMERGENCY DEPT VISIT MOD MDM: CPT | Performed by: EMERGENCY MEDICINE

## 2025-02-01 PROCEDURE — 84145 PROCALCITONIN (PCT): CPT | Performed by: EMERGENCY MEDICINE

## 2025-02-01 PROCEDURE — 83735 ASSAY OF MAGNESIUM: CPT | Performed by: EMERGENCY MEDICINE

## 2025-02-01 PROCEDURE — 96361 HYDRATE IV INFUSION ADD-ON: CPT

## 2025-02-01 PROCEDURE — 99284 EMERGENCY DEPT VISIT MOD MDM: CPT

## 2025-02-01 PROCEDURE — 96366 THER/PROPH/DIAG IV INF ADDON: CPT

## 2025-02-01 RX ORDER — ACETAMINOPHEN 10 MG/ML
1000 INJECTION, SOLUTION INTRAVENOUS ONCE
Status: COMPLETED | OUTPATIENT
Start: 2025-02-01 | End: 2025-02-01

## 2025-02-01 RX ORDER — MAGNESIUM SULFATE HEPTAHYDRATE 40 MG/ML
2 INJECTION, SOLUTION INTRAVENOUS ONCE
Status: COMPLETED | OUTPATIENT
Start: 2025-02-01 | End: 2025-02-01

## 2025-02-01 RX ADMIN — SODIUM CHLORIDE 1000 ML: 0.9 INJECTION, SOLUTION INTRAVENOUS at 18:12

## 2025-02-01 RX ADMIN — MAGNESIUM SULFATE HEPTAHYDRATE 2 G: 40 INJECTION, SOLUTION INTRAVENOUS at 18:23

## 2025-02-01 RX ADMIN — ACETAMINOPHEN 1000 MG: 10 INJECTION INTRAVENOUS at 18:23

## 2025-02-01 RX ADMIN — SODIUM CHLORIDE 1000 ML: 0.9 INJECTION, SOLUTION INTRAVENOUS at 21:13

## 2025-02-01 NOTE — TELEPHONE ENCOUNTER
"Regardin weeks pregnant , hip pain, headache,fevr 101  ----- Message from Benita POWERS sent at 2025  3:56 PM EST -----  \"I am 32 weeks pregnant and I have a fever of 101, hip pain , and headache all day. I am not sure what to do\"    "

## 2025-02-01 NOTE — TELEPHONE ENCOUNTER
"Answer Assessment - Initial Assessment Questions  1. TEMPERATURE: \"What is the most recent temperature?\"  \"How was it measured?\"       101 (oral) in the last 10-15 minutes.    650mg of Tylenol at 8am.     2. ONSET: \"When did the fever start?\"       Today since the morning.     3. CHILLS: \"Do you have chills?\" If Yes, ask: \"How bad are they?\"  (e.g., none, mild, moderate, severe)      Yes    4. CAUSE: If there are no symptoms, ask: \"What do you think is causing the fever?\"       Unknown    5. CONTACTS: \"Does anyone else in the family have an infection?\"      No    6. TREATMENT: \"What have you done so far to treat this fever?\" (e.g., medicines)      Took Tylenol this morning. Has not taken again since then.     7. IMMUNOCOMPROMISE: \"Do you have of the following: diabetes, HIV positive, splenectomy, chronic steroid treatment, transplant patient, etc.\"      Denies    8. OTHER SYMPTOMS: \"Do you have any other symptoms?\" (e.g., abdomen pain, cough, decreased fetal movement, diarrhea, headache, leaking fluid or concern water broke, sore throat, urination pain, vaginal bleeding)      Headache. Mild sore throat. Hip pain. Some belly pain earlier- resolved. No decrease in FM. No vaginal bleeding or fluid leakage.     9. JAKE: \"What date are you expecting to deliver?      3/29/25    10. TRAVEL: \"Have you traveled out of the country in the last month?\" (e.g., travel history, exposures)        Denies    Protocols used: Pregnancy - Fever-Adult-AH      Per Dr. Luis- should be evaluated in ED since still running a fever after taking Tylenol.     Pt aware and verbalized understanding.   "

## 2025-02-01 NOTE — ED PROVIDER NOTES
Time reflects when diagnosis was documented in both MDM as applicable and the Disposition within this note       Time User Action Codes Description Comment    2/1/2025  6:55 PM Juan Pablo Lr Add [B34.9] Viral syndrome           ED Disposition       None          Assessment & Plan       Medical Decision Making  Obtain viral swab, strep PCR, patient is also agreeable to getting chest x-ray, UA to rule out any infection  Give IV fluids, antipyretics, magnesium for body aches and continue to monitor patient for any worsening symptoms    Chest x-ray did not show any acute abnormalities.  Viral swabs is pending.  Care of patient signed out to the next attending will follow-up with patient after fluids are completed as well as lab studies have resulted.  Patient will then be reassessed and dispositioned appropriately.        Amount and/or Complexity of Data Reviewed  Labs: ordered. Decision-making details documented in ED Course.  Radiology: ordered and independent interpretation performed. Decision-making details documented in ED Course.    Risk  Prescription drug management.        ED Course as of 02/01/25 1856   Sat Feb 01, 2025   1821 Fetal heart tone 180.       Medications   sodium chloride 0.9 % bolus 1,000 mL (1,000 mL Intravenous New Bag 2/1/25 1812)   magnesium sulfate 2 g/50 mL IVPB (premix) 2 g (2 g Intravenous New Bag 2/1/25 1823)   acetaminophen (Ofirmev) injection 1,000 mg (0 mg Intravenous Stopped 2/1/25 1839)       ED Risk Strat Scores                                              History of Present Illness       Chief Complaint   Patient presents with    Cold Like Symptoms     Pt states that she had a fever, chills and sore throat. Pt her fever was 101 and took tylenol at 8am. Pt is is 32 weeks preg.        Past Medical History:   Diagnosis Date    Abnormal Pap smear of cervix 3/29/21    Pap: 3/15/21 ASCUS / HPV OTHER-positive, 16/18 negative    Asthma     as teen    HPV (human papilloma virus) infection      Pap: 3/15/21 ASCUS/HPV HR+  (1618 neg)    Varicella     had both chicken pox ans vaccine      Past Surgical History:   Procedure Laterality Date    WISDOM TOOTH EXTRACTION      WRIST SURGERY        Family History   Adopted: Yes   Problem Relation Age of Onset    No Known Problems Mother     No Known Problems Father     No Known Problems Sister     No Known Problems Brother     Cancer Maternal Grandmother         Lung cancer    Vaginal cancer Paternal Aunt     No Known Problems Half-Brother     No Known Problems Half-Brother     No Known Problems Half-Brother     No Known Problems Half-Sister       Social History     Tobacco Use    Smoking status: Never    Smokeless tobacco: Never   Vaping Use    Vaping status: Never Used   Substance Use Topics    Alcohol use: Not Currently    Drug use: Never      E-Cigarette/Vaping    E-Cigarette Use Never User       E-Cigarette/Vaping Substances    Nicotine No     THC No     CBD No     Flavoring No     Other No     Unknown No       I have reviewed and agree with the history as documented.     30-year-old  approximately 32 weeks gestation female presents to the ED for evaluation of generalized bodyaches, back pain, fevers, sore throat, congestion since waking up this morning.  Patient denies any cough.  Patient states that she has had ongoing shortness of breath due to her expanding uterus throughout the pregnancy.  No worsening shortness of breath noted today.  Took a Tylenol at 8 AM however continued to feel bad.  Subsequently patient came to the ED for further evaluation.      History provided by:  Patient      Review of Systems   Constitutional:  Positive for chills and fever.   HENT:  Positive for sore throat. Negative for ear pain.    Eyes:  Negative for pain and visual disturbance.   Respiratory:  Negative for cough and shortness of breath.    Cardiovascular:  Negative for chest pain and palpitations.   Gastrointestinal:  Negative for abdominal pain and vomiting.    Genitourinary:  Negative for dysuria and hematuria.   Musculoskeletal:  Negative for arthralgias and back pain.   Skin:  Negative for color change and rash.   Neurological:  Negative for seizures and syncope.   All other systems reviewed and are negative.          Objective       ED Triage Vitals   Temperature Pulse Blood Pressure Respirations SpO2 Patient Position - Orthostatic VS   02/01/25 1722 02/01/25 1722 02/01/25 1722 02/01/25 1722 02/01/25 1722 02/01/25 1745   100 °F (37.8 °C) (!) 154 111/56 20 100 % Lying      Temp Source Heart Rate Source BP Location FiO2 (%) Pain Score    02/01/25 1722 02/01/25 1722 02/01/25 1745 -- --    Oral Monitor Right arm        Vitals      Date and Time Temp Pulse SpO2 Resp BP Pain Score FACES Pain Rating User   02/01/25 1844 -- 128 100 % 22 113/62 -- -- SM   02/01/25 1830 -- 134 100 % 26 113/62 -- -- SM   02/01/25 1745 -- 138 100 % 31 113/65 -- -- SM   02/01/25 1722 100 °F (37.8 °C) 154 100 % 20 111/56 -- -- LD            Physical Exam  Vitals and nursing note reviewed.   Constitutional:       General: She is not in acute distress.     Appearance: She is well-developed.   HENT:      Head: Normocephalic and atraumatic.   Eyes:      Conjunctiva/sclera: Conjunctivae normal.   Cardiovascular:      Rate and Rhythm: Normal rate and regular rhythm.      Heart sounds: No murmur heard.  Pulmonary:      Effort: Pulmonary effort is normal. No respiratory distress.      Breath sounds: Normal breath sounds.   Abdominal:      General: Abdomen is flat. Bowel sounds are normal.      Palpations: Abdomen is soft.      Tenderness: There is no abdominal tenderness.      Comments: Abdomen is soft, appropriately distended with fundal height about 12 cm above the umbilicus.  Fetal heart tone 180.  No tenderness noted to palpation of abdomen.   Musculoskeletal:         General: No swelling.      Cervical back: Neck supple.   Skin:     General: Skin is warm and dry.      Capillary Refill: Capillary  refill takes less than 2 seconds.   Neurological:      Mental Status: She is alert.   Psychiatric:         Mood and Affect: Mood normal.         Results Reviewed       Procedure Component Value Units Date/Time    Strep A PCR [[id number]]  (Normal) Collected: 02/01/25 1807    Lab Status: Final result Specimen: Throat Updated: 02/01/25 1846     STREP A PCR Not Detected    Procalcitonin [[id number]]  (Normal) Collected: 02/01/25 1807    Lab Status: Final result Specimen: Blood from Arm, Right Updated: 02/01/25 1844     Procalcitonin 0.15 ng/ml     HS Troponin 0hr (reflex protocol) [[id number]]  (Normal) Collected: 02/01/25 1807    Lab Status: Final result Specimen: Blood from Arm, Right Updated: 02/01/25 1841     hs TnI 0hr 4 ng/L     HS Troponin I 2hr [[id number]]     Lab Status: No result Specimen: Blood     Lactic acid, plasma (w/reflex if result > 2.0) [[id number]]  (Normal) Collected: 02/01/25 1807    Lab Status: Final result Specimen: Blood from Arm, Right Updated: 02/01/25 1837     LACTIC ACID 1.0 mmol/L     Narrative:      Result may be elevated if tourniquet was used during collection.    Comprehensive metabolic panel [[id number]]  (Abnormal) Collected: 02/01/25 1807    Lab Status: Final result Specimen: Blood from Arm, Right Updated: 02/01/25 1837     Sodium 134 mmol/L      Potassium 3.7 mmol/L      Chloride 103 mmol/L      CO2 22 mmol/L      ANION GAP 9 mmol/L      BUN 6 mg/dL      Creatinine 0.55 mg/dL      Glucose 93 mg/dL      Calcium 8.6 mg/dL      AST 25 U/L      ALT 28 U/L      Alkaline Phosphatase 88 U/L      Total Protein 6.7 g/dL      Albumin 3.7 g/dL      Total Bilirubin 0.25 mg/dL      eGFR 128 ml/min/1.73sq m     Narrative:      National Kidney Disease Foundation guidelines for Chronic Kidney Disease (CKD):     Stage 1 with normal or high GFR (GFR > 90 mL/min/1.73 square meters)    Stage 2 Mild CKD (GFR = 60-89 mL/min/1.73 square meters)    Stage 3A Moderate CKD (GFR = 45-59 mL/min/1.73 square  meters)    Stage 3B Moderate CKD (GFR = 30-44 mL/min/1.73 square meters)    Stage 4 Severe CKD (GFR = 15-29 mL/min/1.73 square meters)    Stage 5 End Stage CKD (GFR <15 mL/min/1.73 square meters)  Note: GFR calculation is accurate only with a steady state creatinine    Magnesium [[id number]]  (Abnormal) Collected: 02/01/25 1807    Lab Status: Final result Specimen: Blood from Arm, Right Updated: 02/01/25 1837     Magnesium 1.8 mg/dL     UA w Reflex to Microscopic w Reflex to Culture [[id number]]  (Abnormal) Collected: 02/01/25 1811    Lab Status: Final result Specimen: Urine, Clean Catch Updated: 02/01/25 1827     Color, UA Colorless     Clarity, UA Clear     Specific Gravity, UA <1.005     pH, UA 7.0     Leukocytes, UA Negative     Nitrite, UA Negative     Protein, UA Negative mg/dl      Glucose, UA Negative mg/dl      Ketones, UA Negative mg/dl      Urobilinogen, UA <2.0 mg/dl      Bilirubin, UA Negative     Occult Blood, UA Negative     URINE COMMENT --    Urine culture [[id number]] Collected: 02/01/25 1811    Lab Status: In process Specimen: Urine, Clean Catch Updated: 02/01/25 1827    CBC and differential [[id number]]  (Abnormal) Collected: 02/01/25 1807    Lab Status: Final result Specimen: Blood from Arm, Right Updated: 02/01/25 1820     WBC 11.52 Thousand/uL      RBC 3.89 Million/uL      Hemoglobin 11.6 g/dL      Hematocrit 34.6 %      MCV 89 fL      MCH 29.8 pg      MCHC 33.5 g/dL      RDW 13.3 %      MPV 8.5 fL      Platelets 238 Thousands/uL      nRBC 0 /100 WBCs      Segmented % 87 %      Immature Grans % 1 %      Lymphocytes % 5 %      Monocytes % 7 %      Eosinophils Relative 0 %      Basophils Relative 0 %      Absolute Neutrophils 9.94 Thousands/µL      Absolute Immature Grans 0.16 Thousand/uL      Absolute Lymphocytes 0.58 Thousands/µL      Absolute Monocytes 0.80 Thousand/µL      Eosinophils Absolute 0.01 Thousand/µL      Basophils Absolute 0.03 Thousands/µL     FLU/RSV/COVID - if FLU/RSV clinically  relevant (2hr TAT) [[id number]] Collected: 02/01/25 1807    Lab Status: In process Specimen: Nares from Nose Updated: 02/01/25 1817            XR chest 1 view portable   ED Interpretation by Juan Pablo Lr DO (02/01 1844)   No acute abnormalities noted.  Formal radiology reading pending.          ECG 12 Lead Documentation Only    Date/Time: 2/1/2025 5:37 PM    Performed by: Juan Pablo Lr DO  Authorized by: Juan Pablo Lr DO    Indications / Diagnosis:  Fever  ECG reviewed by me, the ED Provider: yes    Patient location:  ED  Previous ECG:     Previous ECG:  Unavailable    Comparison to cardiac monitor: Yes    Interpretation:     Interpretation: abnormal    Comments:      Sinus rhythm, rate 143, normal axis, normal intervals, no acute ST/T wave abnormalities noted, sinus tachycardia noted, otherwise unremarkable EKG, no previous EKG available for comparison.      ED Medication and Procedure Management   Prior to Admission Medications   Prescriptions Last Dose Informant Patient Reported? Taking?   ALBUTEROL IN  Self Yes No   Patient not taking: Reported on 3/22/2024   CHOLINE PO  Self Yes No   Sig: Take by mouth   Patient not taking: Reported on 11/15/2024   Prenatal Vit-Fe Fumarate-FA (PRENATAL VITAMIN PO)  Self Yes No   Sig: Take by mouth      Facility-Administered Medications: None     Patient's Medications   Discharge Prescriptions    No medications on file     No discharge procedures on file.  ED SEPSIS DOCUMENTATION   Time reflects when diagnosis was documented in both MDM as applicable and the Disposition within this note       Time User Action Codes Description Comment    2/1/2025  6:55 PM Juan Pablo Lr Add [B34.9] Viral syndrome                  Juan Pablo Lr DO  02/01/25 5604

## 2025-02-01 NOTE — TELEPHONE ENCOUNTER
Reason for Disposition  • Patient sounds very sick or weak to the triager    Protocols used: Pregnancy - Fever-Adult-AH

## 2025-02-02 LAB
ATRIAL RATE: 143 BPM
P AXIS: 51 DEGREES
PR INTERVAL: 138 MS
QRS AXIS: 61 DEGREES
QRSD INTERVAL: 72 MS
QT INTERVAL: 266 MS
QTC INTERVAL: 410 MS
T WAVE AXIS: 38 DEGREES
VENTRICULAR RATE: 143 BPM

## 2025-02-02 PROCEDURE — 93010 ELECTROCARDIOGRAM REPORT: CPT | Performed by: INTERNAL MEDICINE

## 2025-02-03 LAB — BACTERIA UR CULT: NORMAL

## 2025-02-07 ENCOUNTER — ROUTINE PRENATAL (OUTPATIENT)
Age: 30
End: 2025-02-07
Payer: COMMERCIAL

## 2025-02-07 VITALS — BODY MASS INDEX: 28.94 KG/M2 | DIASTOLIC BLOOD PRESSURE: 62 MMHG | SYSTOLIC BLOOD PRESSURE: 118 MMHG | WEIGHT: 158.2 LBS

## 2025-02-07 DIAGNOSIS — Z34.03 ENCOUNTER FOR SUPERVISION OF NORMAL FIRST PREGNANCY IN THIRD TRIMESTER: Primary | ICD-10-CM

## 2025-02-07 LAB
SL AMB  POCT GLUCOSE, UA: NORMAL
SL AMB POCT URINE PROTEIN: NORMAL

## 2025-02-07 PROCEDURE — 81002 URINALYSIS NONAUTO W/O SCOPE: CPT | Performed by: OBSTETRICS & GYNECOLOGY

## 2025-02-07 PROCEDURE — PNV: Performed by: OBSTETRICS & GYNECOLOGY

## 2025-02-07 NOTE — PROGRESS NOTES
PN visit  32w6d  Up to date Flu, Tdap  Delivery consent previously signed  Breastfeeding: yes-Has a breast pump  Labs completed: yes; passed 1 hour GTT  ED visit 2/1/25 for cold symptoms  Denies Leaking of Fluid, vaginal bleeding  Contractions  Wilson Alvarez tightening lasts a few min -inconsistently  +Fetal Movement    EPDS: 0

## 2025-02-21 ENCOUNTER — ROUTINE PRENATAL (OUTPATIENT)
Age: 30
End: 2025-02-21
Payer: COMMERCIAL

## 2025-02-21 VITALS — SYSTOLIC BLOOD PRESSURE: 112 MMHG | BODY MASS INDEX: 29.89 KG/M2 | WEIGHT: 163.4 LBS | DIASTOLIC BLOOD PRESSURE: 74 MMHG

## 2025-02-21 DIAGNOSIS — Z34.03 ENCOUNTER FOR SUPERVISION OF NORMAL FIRST PREGNANCY IN THIRD TRIMESTER: Primary | ICD-10-CM

## 2025-02-21 LAB
SL AMB  POCT GLUCOSE, UA: NORMAL
SL AMB POCT URINE PROTEIN: NORMAL

## 2025-02-21 PROCEDURE — PNV: Performed by: OBSTETRICS & GYNECOLOGY

## 2025-02-21 PROCEDURE — 81002 URINALYSIS NONAUTO W/O SCOPE: CPT | Performed by: OBSTETRICS & GYNECOLOGY

## 2025-02-21 NOTE — PROGRESS NOTES
Routine PN visit.   No concerns about FM, no bleeding/LOF.  Occ BH.  No edema.   Feels well.     GBS next visit.   No further US scheduled.

## 2025-02-21 NOTE — PROGRESS NOTES
PN visit  34w6d  Up to date Flu, Tdap  Delivery consent previously signed  Breastfeeding: yes-Has a breast pump  Labs completed: yes; passed 1 hour GTT  Already has a breastpump    No loss of fluid, contractions or bleeding  Some kimberly Alvarez- denies  +Fetal movement

## 2025-02-25 NOTE — PROGRESS NOTES
1ST OB VISIT  Pn1 Labs: Completed        P:  0  LMP: 24   JAKE: 3/29/25  Last Annual Visit: , due today  Last Pap: 3/29/22  NILM  Pap NOT indicated today  GC/CH collected today  Blood Type: B Positive    Has Blue Folder   Denies Leaking of Fluid, vaginal bleeding Cramping.   Nausea and vomiting improved  Q's/Concerns: None   Yes

## 2025-03-04 ENCOUNTER — ROUTINE PRENATAL (OUTPATIENT)
Age: 30
End: 2025-03-04
Payer: COMMERCIAL

## 2025-03-04 VITALS — DIASTOLIC BLOOD PRESSURE: 78 MMHG | BODY MASS INDEX: 30 KG/M2 | WEIGHT: 164 LBS | SYSTOLIC BLOOD PRESSURE: 118 MMHG

## 2025-03-04 DIAGNOSIS — Z34.03 PRIMIGRAVIDA IN THIRD TRIMESTER: Primary | ICD-10-CM

## 2025-03-04 LAB
SL AMB  POCT GLUCOSE, UA: NORMAL
SL AMB POCT URINE PROTEIN: NORMAL

## 2025-03-04 PROCEDURE — PNV: Performed by: OBSTETRICS & GYNECOLOGY

## 2025-03-04 PROCEDURE — 81002 URINALYSIS NONAUTO W/O SCOPE: CPT | Performed by: OBSTETRICS & GYNECOLOGY

## 2025-03-04 PROCEDURE — 87150 DNA/RNA AMPLIFIED PROBE: CPT | Performed by: OBSTETRICS & GYNECOLOGY

## 2025-03-04 NOTE — PROGRESS NOTES
PN visit  36w3d  GBS TODAY!!  Up to date Flu, Tdap  Delivery consent previously signed  Breastfeeding: yes-Has a breast pump      Denies Leaking of Fluid,  contractions or bleeding  Some kimberly Alvarez  +Fetal movement

## 2025-03-06 ENCOUNTER — RESULTS FOLLOW-UP (OUTPATIENT)
Age: 30
End: 2025-03-06

## 2025-03-06 LAB — GP B STREP DNA SPEC QL NAA+PROBE: NEGATIVE

## 2025-03-14 ENCOUNTER — ROUTINE PRENATAL (OUTPATIENT)
Age: 30
End: 2025-03-14
Payer: COMMERCIAL

## 2025-03-14 VITALS — BODY MASS INDEX: 30.22 KG/M2 | DIASTOLIC BLOOD PRESSURE: 62 MMHG | SYSTOLIC BLOOD PRESSURE: 118 MMHG | WEIGHT: 165.2 LBS

## 2025-03-14 DIAGNOSIS — Z34.03 ENCOUNTER FOR SUPERVISION OF NORMAL FIRST PREGNANCY IN THIRD TRIMESTER: Primary | ICD-10-CM

## 2025-03-14 LAB
SL AMB  POCT GLUCOSE, UA: NORMAL
SL AMB POCT URINE PROTEIN: NORMAL

## 2025-03-14 PROCEDURE — PNV: Performed by: OBSTETRICS & GYNECOLOGY

## 2025-03-14 PROCEDURE — 81002 URINALYSIS NONAUTO W/O SCOPE: CPT | Performed by: OBSTETRICS & GYNECOLOGY

## 2025-03-14 NOTE — PROGRESS NOTES
PN visit  37w5d  GBS =Negative  Up to date Flu, Tdap  Delivery consent previously signed  Breastfeeding: yes-Has a breast pump      Denies Leaking of Fluid,  contractions or bleeding  Some kimberly Alvarez   +Fetal movement

## 2025-03-21 ENCOUNTER — ROUTINE PRENATAL (OUTPATIENT)
Age: 30
End: 2025-03-21
Payer: COMMERCIAL

## 2025-03-21 VITALS — DIASTOLIC BLOOD PRESSURE: 80 MMHG | SYSTOLIC BLOOD PRESSURE: 112 MMHG | BODY MASS INDEX: 30.54 KG/M2 | WEIGHT: 167 LBS

## 2025-03-21 DIAGNOSIS — Z34.03 ENCOUNTER FOR SUPERVISION OF NORMAL FIRST PREGNANCY IN THIRD TRIMESTER: Primary | ICD-10-CM

## 2025-03-21 LAB
SL AMB  POCT GLUCOSE, UA: NORMAL
SL AMB POCT URINE PROTEIN: NORMAL

## 2025-03-21 PROCEDURE — PNV: Performed by: OBSTETRICS & GYNECOLOGY

## 2025-03-21 PROCEDURE — 81002 URINALYSIS NONAUTO W/O SCOPE: CPT | Performed by: OBSTETRICS & GYNECOLOGY

## 2025-03-21 NOTE — PROGRESS NOTES
Reports +FM, denies LOF, VB, occa ctx.   NO complaints.      GBS neg.     SCE:  2-3/50/-3, posterior.      Labor precautions, LOVE, LOF, wt gain reviewed.

## 2025-03-21 NOTE — PROGRESS NOTES
PN visit  38w6d  GBS =Negative  Up to date Flu, Tdap  Delivery consent previously signed  Breastfeeding: yes-Has a breast pump      Denies Loss of Fluid, contractions or bleeding  +Fetal movement   Cervical check desired

## 2025-03-24 ENCOUNTER — HOSPITAL ENCOUNTER (INPATIENT)
Facility: HOSPITAL | Age: 30
LOS: 2 days | Discharge: HOME | End: 2025-03-26
Attending: OBSTETRICS & GYNECOLOGY | Admitting: OBSTETRICS & GYNECOLOGY
Payer: COMMERCIAL

## 2025-03-24 ENCOUNTER — NURSE TRIAGE (OUTPATIENT)
Age: 30
End: 2025-03-24

## 2025-03-24 DIAGNOSIS — N85.8 ALTERATION IN COMFORT ASSOCIATED WITH UTERINE CONTRACTIONS: ICD-10-CM

## 2025-03-24 LAB
ABO GROUP BLD: NORMAL
BASE EXCESS BLDCOA CALC-SCNC: -12.4 MMOL/L (ref 3–11)
BASE EXCESS BLDCOV CALC-SCNC: -10.7 MMOL/L (ref 1–9)
BASOPHILS # BLD AUTO: 0.05 THOUSANDS/ÂΜL (ref 0–0.1)
BASOPHILS NFR BLD AUTO: 0 % (ref 0–1)
BLD GP AB SCN SERPL QL: NEGATIVE
EOSINOPHIL # BLD AUTO: 0.24 THOUSAND/ÂΜL (ref 0–0.61)
EOSINOPHIL NFR BLD AUTO: 2 % (ref 0–6)
ERYTHROCYTE [DISTWIDTH] IN BLOOD BY AUTOMATED COUNT: 13.5 % (ref 11.6–15.1)
HCO3 BLDCOA-SCNC: 18.3 MMOL/L (ref 17.3–27.3)
HCO3 BLDCOV-SCNC: 16.8 MMOL/L (ref 12.2–28.6)
HCT VFR BLD AUTO: 36.9 % (ref 34.8–46.1)
HGB BLD-MCNC: 12.6 G/DL (ref 11.5–15.4)
HOLD SPECIMEN: NORMAL
IMM GRANULOCYTES # BLD AUTO: 0.1 THOUSAND/UL (ref 0–0.2)
IMM GRANULOCYTES NFR BLD AUTO: 1 % (ref 0–2)
LYMPHOCYTES # BLD AUTO: 2.25 THOUSANDS/ÂΜL (ref 0.6–4.47)
LYMPHOCYTES NFR BLD AUTO: 19 % (ref 14–44)
MCH RBC QN AUTO: 29.4 PG (ref 26.8–34.3)
MCHC RBC AUTO-ENTMCNC: 34.1 G/DL (ref 31.4–37.4)
MCV RBC AUTO: 86 FL (ref 82–98)
MONOCYTES # BLD AUTO: 0.52 THOUSAND/ÂΜL (ref 0.17–1.22)
MONOCYTES NFR BLD AUTO: 5 % (ref 4–12)
NEUTROPHILS # BLD AUTO: 8.52 THOUSANDS/ÂΜL (ref 1.85–7.62)
NEUTS SEG NFR BLD AUTO: 73 % (ref 43–75)
NRBC BLD AUTO-RTO: 0 /100 WBCS
O2 CT VFR BLDCOA CALC: 9 ML/DL
OXYHGB MFR BLDCOA: 39.7 %
OXYHGB MFR BLDCOV: 79.7 %
PCO2 BLDCOA: 61 MM[HG] (ref 30–60)
PCO2 BLDCOV: 42.9 MM HG (ref 27–43)
PH BLDCOA: 7.09 [PH] (ref 7.23–7.43)
PH BLDCOV: 7.21 [PH] (ref 7.19–7.49)
PLATELET # BLD AUTO: 358 THOUSANDS/UL (ref 149–390)
PMV BLD AUTO: 8.8 FL (ref 8.9–12.7)
PO2 BLDCOA: 24 MM HG (ref 5–25)
PO2 BLDCOV: 43.8 MM HG (ref 15–45)
RBC # BLD AUTO: 4.28 MILLION/UL (ref 3.81–5.12)
RH BLD: POSITIVE
SAO2 % BLDCOV: 19 ML/DL
SPECIMEN EXPIRATION DATE: NORMAL
WBC # BLD AUTO: 11.68 THOUSAND/UL (ref 4.31–10.16)

## 2025-03-24 PROCEDURE — 86901 BLOOD TYPING SEROLOGIC RH(D): CPT | Performed by: ADVANCED PRACTICE MIDWIFE

## 2025-03-24 PROCEDURE — 86900 BLOOD TYPING SEROLOGIC ABO: CPT | Performed by: ADVANCED PRACTICE MIDWIFE

## 2025-03-24 PROCEDURE — 4A1H7CZ MONITORING OF PRODUCTS OF CONCEPTION, CARDIAC RATE, VIA NATURAL OR ARTIFICIAL OPENING: ICD-10-PCS | Performed by: OBSTETRICS & GYNECOLOGY

## 2025-03-24 PROCEDURE — 85025 COMPLETE CBC W/AUTO DIFF WBC: CPT | Performed by: ADVANCED PRACTICE MIDWIFE

## 2025-03-24 PROCEDURE — 82805 BLOOD GASES W/O2 SATURATION: CPT | Performed by: OBSTETRICS & GYNECOLOGY

## 2025-03-24 PROCEDURE — 59400 OBSTETRICAL CARE: CPT | Performed by: ADVANCED PRACTICE MIDWIFE

## 2025-03-24 PROCEDURE — 3E0P7VZ INTRODUCTION OF HORMONE INTO FEMALE REPRODUCTIVE, VIA NATURAL OR ARTIFICIAL OPENING: ICD-10-PCS | Performed by: OBSTETRICS & GYNECOLOGY

## 2025-03-24 PROCEDURE — 86780 TREPONEMA PALLIDUM: CPT | Performed by: ADVANCED PRACTICE MIDWIFE

## 2025-03-24 PROCEDURE — 86850 RBC ANTIBODY SCREEN: CPT | Performed by: ADVANCED PRACTICE MIDWIFE

## 2025-03-24 RX ORDER — BUTORPHANOL TARTRATE 1 MG/ML
1 INJECTION, SOLUTION INTRAMUSCULAR; INTRAVENOUS
Status: DISCONTINUED | OUTPATIENT
Start: 2025-03-24 | End: 2025-03-25

## 2025-03-24 RX ORDER — BUPIVACAINE HYDROCHLORIDE 2.5 MG/ML
30 INJECTION, SOLUTION EPIDURAL; INFILTRATION; INTRACAUDAL; PERINEURAL ONCE AS NEEDED
Status: DISCONTINUED | OUTPATIENT
Start: 2025-03-24 | End: 2025-03-25

## 2025-03-24 RX ORDER — SODIUM CHLORIDE, SODIUM LACTATE, POTASSIUM CHLORIDE, CALCIUM CHLORIDE 600; 310; 30; 20 MG/100ML; MG/100ML; MG/100ML; MG/100ML
125 INJECTION, SOLUTION INTRAVENOUS CONTINUOUS
Status: DISCONTINUED | OUTPATIENT
Start: 2025-03-24 | End: 2025-03-25

## 2025-03-24 RX ORDER — OXYTOCIN/RINGER'S LACTATE 30/500 ML
1-30 PLASTIC BAG, INJECTION (ML) INTRAVENOUS
Status: DISCONTINUED | OUTPATIENT
Start: 2025-03-24 | End: 2025-03-25

## 2025-03-24 RX ORDER — ONDANSETRON 2 MG/ML
4 INJECTION INTRAMUSCULAR; INTRAVENOUS ONCE
Status: COMPLETED | OUTPATIENT
Start: 2025-03-24 | End: 2025-03-24

## 2025-03-24 RX ORDER — LIDOCAINE HYDROCHLORIDE 10 MG/ML
INJECTION, SOLUTION EPIDURAL; INFILTRATION; INTRACAUDAL; PERINEURAL
Status: DISCONTINUED
Start: 2025-03-24 | End: 2025-03-25 | Stop reason: WASHOUT

## 2025-03-24 RX ADMIN — ONDANSETRON 4 MG: 2 INJECTION, SOLUTION INTRAMUSCULAR; INTRAVENOUS at 22:04

## 2025-03-24 RX ADMIN — SODIUM CHLORIDE, SODIUM LACTATE, POTASSIUM CHLORIDE, AND CALCIUM CHLORIDE 125 ML/HR: .6; .31; .03; .02 INJECTION, SOLUTION INTRAVENOUS at 17:31

## 2025-03-24 RX ADMIN — Medication 2 MILLI-UNITS/MIN: at 17:34

## 2025-03-24 RX ADMIN — Medication 50 MCG: at 13:23

## 2025-03-24 NOTE — OB LABOR/OXYTOCIN SAFETY PROGRESS
Labor Progress Note - Sherrie Saucedo 30 y.o. female MRN: [id number]    Unit/Bed#: -01 Encounter: [id number]       Contraction Frequency (minutes): irritability        Cervical Dilation: 4        Cervical Effacement: 60  Fetal Station: -3  Baseline Rate (FHR): 130 bpm  Fetal Heart Rate (FHT): 140 BPM  FHR Category: 1               Vital Signs:   Vitals:    25 1655   BP: 117/89   Pulse: 82   Resp: 18   Temp: 98.6 °F (37 °C)       Notes/comments:   Sherrie states +cramping continues but tolerable  Discussed another cytotec vs.  Initiating pitocin.  Reviewed risks, benefits, and alternatives to both.  Sherrie agreeable to another cytotec.  Small bloody show  Continued labor support  Continuous fetal monitoring  Hopeful for YAZMIN Middleton CNM 3/24/2025 5:16 PM

## 2025-03-24 NOTE — H&P
OB H&P  Sherrie Lewis  1995  [birth year]          30 y.o. yo  at 39 weeks and 2 days gestation by us and lmp      Assessment:/Plan:    IUP at 39 week and 2 days gestation.   GBS negative  SROM on 3/24/25 @ 0845, clear fluid    Admission to L&D  V/S per protocol  Admission labs  Continuous fetal monitoring  Reviewed risks, benefits, and alternatives to cytotec.  Questions answered to the apparent satisfaction of Sherrie and Sathish.  Sherrie is agreeable to proceed with cytotec.  Dr. Zavala aware of patient and FHT status  Hopeful for       Chief complaint:  Leakage of fluids from vagina    HPI:  Contractions:  no contractions, + irregular menstrual like cramping  Fetal movement:  yes  Vaginal bleeding:   no  Leaking of fluid:  yes      Pregnancy Complications:  Patient Active Problem List   Diagnosis    12 weeks gestation of pregnancy    Encounter for supervision of normal first pregnancy in first trimester         Past Medical History:  Past Medical History:   Diagnosis Date    Abnormal Pap smear of cervix 3/29/21    Pap: 3/15/21 ASCUS / HPV OTHER-positive, 16/18 negative    Asthma     as teen    HPV (human papilloma virus) infection     Pap: 3/15/21 ASCUS/HPV HR+  (16/18 neg)    Varicella     had both chicken pox ans vaccine       Past Surgical History:  Past Surgical History:   Procedure Laterality Date    WISDOM TOOTH EXTRACTION      WRIST SURGERY         Social Hx:  Social History     Socioeconomic History    Marital status: /Civil Union     Spouse name: Not on file    Number of children: Not on file    Years of education: Not on file    Highest education level: Not on file   Occupational History    Not on file   Tobacco Use    Smoking status: Never    Smokeless tobacco: Never   Vaping Use    Vaping status: Never Used   Substance and Sexual Activity    Alcohol use: Not Currently    Drug use: Never    Sexual activity: Yes     Partners: Male     Birth control/protection: None    Other Topics Concern    Not on file   Social History Narrative    Pt adopted     Lives with balwinder Burch.  Six years older.  Works as a .  [employer].    Went to [employer] for speech pathology.  Now in graduate school at [employer] for speech therapy.    Has a transgender brother.    assorted sisters and brothers from biological father and mother, keeps in touch with some     Works as a  at [employer].     Enjoys snowboarding.     Social Drivers of Health     Financial Resource Strain: Not on file   Food Insecurity: No Food Insecurity (9/3/2024)    Nursing - Inadequate Food Risk Classification     Worried About Running Out of Food in the Last Year: Never true     Ran Out of Food in the Last Year: Never true     Ran Out of Food in the Last Year: Not on file   Transportation Needs: No Transportation Needs (9/3/2024)    PRAPARE - Transportation     Lack of Transportation (Medical): No     Lack of Transportation (Non-Medical): No   Physical Activity: Not on file   Stress: Not on file   Social Connections: Not on file   Intimate Partner Violence: Not on file   Housing Stability: Low Risk  (9/3/2024)    Housing Stability Vital Sign     Unable to Pay for Housing in the Last Year: No     Number of Times Moved in the Last Year: 0     Homeless in the Last Year: No       Meds:  No current facility-administered medications on file prior to encounter.     Current Outpatient Medications on File Prior to Encounter   Medication Sig Dispense Refill    Prenatal Vit-Fe Fumarate-FA (PRENATAL VITAMIN PO) Take by mouth      ALBUTEROL IN  (Patient not taking: Reported on 3/22/2024)      CHOLINE PO Take by mouth (Patient not taking: Reported on 11/15/2024)         Allergies:  No Known Allergies    RoS/    Constitutional:       General: She is not in acute distress.     Appearance: Normal appearance.   HENT:      Head: Normocephalic.      Nose: Nose normal.   Eyes:      General: No scleral icterus.  Cardiovascular:      Pulses:  "Normal pulses.   Pulmonary:      Effort: Pulmonary effort is normal. No respiratory distress.      Breath sounds: No wheezing.   Abdominal:      General: Bowel sounds are normal. There is no distension.      Palpations: Abdomen is soft.      Tenderness: There is no abdominal tenderness. There is no guarding.   Musculoskeletal:      Cervical back: Neck supple.      Right lower leg: No edema.      Left lower leg: No edema.   Skin:     General: Skin is warm.      Capillary Refill: Capillary refill takes less than 2 seconds.   Neurological:      Mental Status: She is alert and oriented to person, place, and time.   Psychiatric:         Mood and Affect: Mood normal.               Obstetric History:        Labs:  Strep Grp B PCR   Date Value Ref Range Status   2025 Negative Negative Final     Comment:           Type & Screen:  ABO Grouping   Date Value Ref Range Status   2025 B  Final      Rh Factor   Date Value Ref Range Status   2025 Positive  Final    No results found for: \"ANTIBODYSCR\"    Specimen Expiration Date   Date Value Ref Range Status   [id number]  Final     No results found for: \"HIVAGAB\"  Hepatitis B Surface Ag   Date Value Ref Range Status   2024 Non-reactive Non-Reactive Final     No results found for: \"RPR\"  Rubella IgG Quant   Date Value Ref Range Status   2024 16.7 >14.9 IU/mL Final     Glucose   Date Value Ref Range Status   2024 107 70 - 134 mg/dL Final     Comment:     <=134 Normal   135-179 Impaired glucose fasting. Perform 3 Hour Glucose Tolerance   >=180 Diagnosis Gestational Diabetes Mellitus             Physical Exam:      Vital signs: 97.8  128/77, 91, 18     Alert, comfortable, no acute distress      Neuro:        Alert, appropriate affect, no gross deficits        Normal speech        CN2-12 intact and symmetric        DTR 3+ and symmetric        Muscle strength 5/5 and symmetric    Regular rate and rhythm    Clear to auscultation bilaterally   "  Abdomen soft, nontender, nondistended.    Fundus nontender, size consistent with dates      Cephalic  Cervix: 3 cm, 50%, -3, vtx confirmed on bedside ultrasound  FHR: cat 1  CTXN: none  Membranes: SROM on 3/24/25 @ 0845  Est. Fetal Weight: 7lbs  Pelvis adequate  Fetal Position: vtx

## 2025-03-24 NOTE — PLAN OF CARE
Problem: BIRTH - VAGINAL/ SECTION  Goal: Fetal and maternal status remain reassuring during the birth process  Description: INTERVENTIONS:- Monitor vital signs- Monitor fetal heart rate- Monitor uterine activity- Monitor labor progression (vaginal delivery)- DVT prophylaxis- Antibiotic prophylaxis  Outcome: Progressing  Goal: Emotionally satisfying birthing experience for mother/fetus  Description: Interventions:- Assess, plan, implement and evaluate the nursing care given to the patient in labor- Advocate the philosophy that each childbirth experience is a unique experience and support the family's chosen level of involvement and control during the labor process - Actively participate in both the patient's and family's teaching of the birth process- Consider cultural, Congregation and age-specific factors and plan care for the patient in labor  Outcome: Progressing

## 2025-03-24 NOTE — TELEPHONE ENCOUNTER
"FOLLOW UP: go to L&D     [facility] Secure Chat sent to Dr Zavala   Epic Secure Chat sent to [facility]   REASON FOR CONVERSATION: Rupture of Membranes    SYMPTOMS: ROM at 9am clear fluid     OTHER: Pt calling in she's 39w2d , pt saying her water broke at 9am small gush then bigger gush, clear fluid. Pt c/o lower back pain 2/10. Pt reporting +FM. Pt denies abdominal pain, vaginal bleeding, fever, hand/facial swelling    DISPOSITION: Go to LD Now        Reason for Disposition   Leakage of fluid from vagina    Answer Assessment - Initial Assessment Questions  1. ONSET: \"When did the symptoms begin?\"         9am clear fluid   2. CONTRACTIONS: \"Are you having any contractions?\" If yes, ask: \"Describe the contractions that you are having.\" (e.g., duration, frequency, regularity, severity)      Back pain   3. JAKE: \"What date are you expecting to deliver?\"      3/29/25  4. PARITY: \"Have you had a baby before?\" If Yes, ask: \"How long did the labor last?\"        5. FETAL MOVEMENT: \"Has the baby's movement decreased or changed significantly from normal?\"      Pt reporting +FM   6. OTHER SYMPTOMS: \"Do you have any other symptoms?\" (e.g., abdominal pain, vaginal bleeding, fever, hand/facial swelling)      Pt denies  abdominal pain, vaginal bleeding, fever, hand/facial swelling    Protocols used: Pregnancy - Rupture of Membranes-ADULT-[place]    "

## 2025-03-24 NOTE — QUICK NOTE
Sherrie changed her mind and states she would like to initiate pitocin.  Cytotec discontinued  Order for pitocin placed.

## 2025-03-24 NOTE — PROGRESS NOTES
Sherrie is doing well.  No concerns offered.  Cytotec was given @ 1330.  FHTs 145 bpm, + accels, - decels, good variability  Ucs occas, + irritibility  Cx deferred  Pain management as indicated  Continued labor support

## 2025-03-24 NOTE — OB LABOR/OXYTOCIN SAFETY PROGRESS
Oxytocin Safety Progress Check Note - Sherrie Saucedo 30 y.o. female MRN: [id number]    Unit/Bed#: -01 Encounter: [id number]    Dose (hi-units/min) Oxytocin: 2 hi-units/min  Contraction Frequency (minutes): 2-2  Contraction Intensity: (P) Mild  Uterine Activity Characteristics: Regular  Cervical Dilation: 4        Cervical Effacement: 60  Fetal Station: -3  Baseline Rate (FHR): 130 bpm  Fetal Heart Rate (FHT): (P) 130 BPM  FHR Category: 1               Vital Signs:    Vitals:    25 1821   BP: 128/77   Pulse: 84   Resp:    Temp: 98 °F (36.7 °C)       Notes/comments:   29 yo  at 39+2 with SROM undergoing augmentation.  - Continue pitocin per protocol  - GBS negative      Ila Desouza MD 3/24/2025 6:31 PM

## 2025-03-25 LAB
BASOPHILS # BLD AUTO: 0.05 THOUSANDS/ÂΜL (ref 0–0.1)
BASOPHILS NFR BLD AUTO: 0 % (ref 0–1)
EOSINOPHIL # BLD AUTO: 0.02 THOUSAND/ÂΜL (ref 0–0.61)
EOSINOPHIL NFR BLD AUTO: 0 % (ref 0–6)
ERYTHROCYTE [DISTWIDTH] IN BLOOD BY AUTOMATED COUNT: 13.6 % (ref 11.6–15.1)
HCT VFR BLD AUTO: 31.6 % (ref 34.8–46.1)
HGB BLD-MCNC: 11 G/DL (ref 11.5–15.4)
IMM GRANULOCYTES # BLD AUTO: 0.17 THOUSAND/UL (ref 0–0.2)
IMM GRANULOCYTES NFR BLD AUTO: 1 % (ref 0–2)
LYMPHOCYTES # BLD AUTO: 2.96 THOUSANDS/ÂΜL (ref 0.6–4.47)
LYMPHOCYTES NFR BLD AUTO: 13 % (ref 14–44)
MCH RBC QN AUTO: 30.3 PG (ref 26.8–34.3)
MCHC RBC AUTO-ENTMCNC: 34.8 G/DL (ref 31.4–37.4)
MCV RBC AUTO: 87 FL (ref 82–98)
MONOCYTES # BLD AUTO: 0.95 THOUSAND/ÂΜL (ref 0.17–1.22)
MONOCYTES NFR BLD AUTO: 4 % (ref 4–12)
NEUTROPHILS # BLD AUTO: 18.34 THOUSANDS/ÂΜL (ref 1.85–7.62)
NEUTS SEG NFR BLD AUTO: 82 % (ref 43–75)
NRBC BLD AUTO-RTO: 0 /100 WBCS
PLATELET # BLD AUTO: 288 THOUSANDS/UL (ref 149–390)
PMV BLD AUTO: 8.8 FL (ref 8.9–12.7)
RBC # BLD AUTO: 3.63 MILLION/UL (ref 3.81–5.12)
TREPONEMA PALLIDUM IGG+IGM AB [PRESENCE] IN SERUM OR PLASMA BY IMMUNOASSAY: NORMAL
WBC # BLD AUTO: 22.49 THOUSAND/UL (ref 4.31–10.16)

## 2025-03-25 PROCEDURE — 99024 POSTOP FOLLOW-UP VISIT: CPT | Performed by: OBSTETRICS & GYNECOLOGY

## 2025-03-25 PROCEDURE — 85025 COMPLETE CBC W/AUTO DIFF WBC: CPT | Performed by: OBSTETRICS & GYNECOLOGY

## 2025-03-25 PROCEDURE — 88307 TISSUE EXAM BY PATHOLOGIST: CPT | Performed by: PATHOLOGY

## 2025-03-25 RX ORDER — ACETAMINOPHEN 325 MG/1
650 TABLET ORAL EVERY 4 HOURS PRN
Status: DISCONTINUED | OUTPATIENT
Start: 2025-03-25 | End: 2025-03-26 | Stop reason: HOSPADM

## 2025-03-25 RX ORDER — OXYCODONE HYDROCHLORIDE 5 MG/1
5 TABLET ORAL EVERY 8 HOURS PRN
Status: DISCONTINUED | OUTPATIENT
Start: 2025-03-25 | End: 2025-03-26 | Stop reason: HOSPADM

## 2025-03-25 RX ORDER — DOCUSATE SODIUM 100 MG/1
100 CAPSULE, LIQUID FILLED ORAL 2 TIMES DAILY
Status: DISCONTINUED | OUTPATIENT
Start: 2025-03-25 | End: 2025-03-26 | Stop reason: HOSPADM

## 2025-03-25 RX ORDER — CALCIUM CARBONATE 500 MG/1
1000 TABLET, CHEWABLE ORAL DAILY PRN
Status: DISCONTINUED | OUTPATIENT
Start: 2025-03-25 | End: 2025-03-26 | Stop reason: HOSPADM

## 2025-03-25 RX ORDER — IBUPROFEN 600 MG/1
600 TABLET, FILM COATED ORAL EVERY 6 HOURS
Status: DISCONTINUED | OUTPATIENT
Start: 2025-03-25 | End: 2025-03-26 | Stop reason: HOSPADM

## 2025-03-25 RX ORDER — KETOROLAC TROMETHAMINE 30 MG/ML
30 INJECTION, SOLUTION INTRAMUSCULAR; INTRAVENOUS EVERY 6 HOURS PRN
Status: DISCONTINUED | OUTPATIENT
Start: 2025-03-25 | End: 2025-03-26 | Stop reason: HOSPADM

## 2025-03-25 RX ORDER — DIPHENHYDRAMINE HCL 25 MG
25 TABLET ORAL EVERY 6 HOURS PRN
Status: DISCONTINUED | OUTPATIENT
Start: 2025-03-25 | End: 2025-03-26 | Stop reason: HOSPADM

## 2025-03-25 RX ORDER — ALBUTEROL SULFATE 90 UG/1
2 INHALANT RESPIRATORY (INHALATION) EVERY 4 HOURS PRN
Status: DISCONTINUED | OUTPATIENT
Start: 2025-03-25 | End: 2025-03-26 | Stop reason: HOSPADM

## 2025-03-25 RX ORDER — ONDANSETRON 2 MG/ML
4 INJECTION INTRAMUSCULAR; INTRAVENOUS EVERY 8 HOURS PRN
Status: DISCONTINUED | OUTPATIENT
Start: 2025-03-25 | End: 2025-03-26 | Stop reason: HOSPADM

## 2025-03-25 RX ORDER — BENZOCAINE/MENTHOL 6 MG-10 MG
1 LOZENGE MUCOUS MEMBRANE DAILY PRN
Status: DISCONTINUED | OUTPATIENT
Start: 2025-03-25 | End: 2025-03-26 | Stop reason: HOSPADM

## 2025-03-25 RX ADMIN — PRENATAL VITAMINS-IRON FUMARATE 27 MG IRON-FOLIC ACID 0.8 MG TABLET 1 TABLET: at 08:16

## 2025-03-25 RX ADMIN — DOCUSATE SODIUM 100 MG: 100 CAPSULE, LIQUID FILLED ORAL at 08:17

## 2025-03-25 RX ADMIN — DOCUSATE SODIUM 100 MG: 100 CAPSULE, LIQUID FILLED ORAL at 17:42

## 2025-03-25 RX ADMIN — IBUPROFEN 600 MG: 600 TABLET, FILM COATED ORAL at 08:16

## 2025-03-25 NOTE — OB LABOR/OXYTOCIN SAFETY PROGRESS
Oxytocin Safety Progress Check Note - Sherrie Saucedo 30 y.o. female MRN: [id number]    Unit/Bed#: -01 Encounter: [id number]    Dose (hi-units/min) Oxytocin: 2 hi-units/min  Contraction Frequency (minutes): patient states every 1.5-2 minutes (difficult to trace due to maternal movement)  Contraction Intensity: Mild  Uterine Activity Characteristics: Irritability  Cervical Dilation: 6        Cervical Effacement: 90  Fetal Station: -1  Baseline Rate (FHR): 130 bpm  Fetal Heart Rate (FHT): 140 BPM  FHR Category: 2               Vital Signs:   Vitals:    25 2210   BP: 121/73   Pulse: 82   Resp:    Temp: 98 °F (36.7 °C)       Notes/comments:   Sherrie is coping well with contractions utilizing breathing techniques  Continued labor support  Sherrie declines medicinal pain management at this time  Continue pitocin as previously ordered  Hopeful for YAZMIN Middleton CNM 3/24/2025 10:17 PM

## 2025-03-25 NOTE — PROGRESS NOTES
Progress Note - OB/GYN   Name: Sherrie Kauffman y.o. female I MRN: [id number]  Unit/Bed#: -01 I Date of Admission: 3/24/2025   Date of Service: 3/25/2025 I Hospital Day: 1     Assessment & Plan   (spontaneous vaginal delivery)      Progress Note - OB/GYN  Post-Partum Physician Note   Sherrie Kauffman y.o. female MRN: [id number]  Unit/Bed#: -01 Encounter: [id number]    Patient is  post partum day 1 from an     Pain: controlled  Tolerating Oral Intake: yes  Voiding: yes  Flatus: yes  BM- no  Ambulating: yes  Breastfeeding: well  Chest Pain: no  Shortness of Breath: no  Leg Pain/Discomfort: no  Lochia: minimal      Objective:   Vitals:    25 1125   BP: 115/69   Pulse: 100   Resp: 18   Temp: 98.2 °F (36.8 °C)   SpO2: 98%       Intake/Output Summary (Last 24 hours) at 3/25/2025 1353  Last data filed at 3/25/2025 0400  Gross per 24 hour   Intake --   Output 1200 ml   Net -1200 ml         Labs    Recent Results (from the past 24 hours)   CORD, Blood gas, venous    Collection Time: 25 11:53 PM   Result Value Ref Range    pH, Cord Evert 7.211 7.190 - 7.490    pCO2, Cord Evert 42.9 27.0 - 43.0 mm HG    pO2, Cord Evert 43.8 15.0 - 45.0 mm HG    HCO3, Cord Evert 16.8 12.2 - 28.6 mmol/L    Base Exc, Cord Evert -10.7 (L) 1.0 - 9.0 mmol/L    O2 Cont, Cord Evert 19.0 mL/dL    O2 HGB,VENOUS CORD 79.7 %   CORD, Blood gas, arterial    Collection Time: 25 11:53 PM   Result Value Ref Range    pH, Cord Art 7.094 (L) 7.230 - 7.430    pCO2, Cord Art 61.0 (H) 30.0 - 60.0    pO2, Cord Art 24.0 5.0 - 25.0 mm HG    HCO3, Cord Art 18.3 17.3 - 27.3 mmol/L    Base Exc, Cord Art -12.4 (L) 3.0 - 11.0 mmol/L    O2 Content, Cord Art 9.0 ml/dl    O2 Hgb, Arterial Cord 39.7 %   CBC and differential    Collection Time: 25  6:10 AM   Result Value Ref Range    WBC 22.49 (H) 4.31 - 10.16 Thousand/uL    RBC 3.63 (L) 3.81 - 5.12 Million/uL    Hemoglobin 11.0 (L) 11.5 - 15.4 g/dL    Hematocrit 31.6 (L) 34.8 - 46.1 %    MCV 87 82 - 98  fL    MCH 30.3 26.8 - 34.3 pg    MCHC 34.8 31.4 - 37.4 g/dL    RDW 13.6 11.6 - 15.1 %    MPV 8.8 (L) 8.9 - 12.7 fL    Platelets 288 149 - 390 Thousands/uL    nRBC 0 /100 WBCs    Segmented % 82 (H) 43 - 75 %    Immature Grans % 1 0 - 2 %    Lymphocytes % 13 (L) 14 - 44 %    Monocytes % 4 4 - 12 %    Eosinophils Relative 0 0 - 6 %    Basophils Relative 0 0 - 1 %    Absolute Neutrophils 18.34 (H) 1.85 - 7.62 Thousands/µL    Absolute Immature Grans 0.17 0.00 - 0.20 Thousand/uL    Absolute Lymphocytes 2.96 0.60 - 4.47 Thousands/µL    Absolute Monocytes 0.95 0.17 - 1.22 Thousand/µL    Eosinophils Absolute 0.02 0.00 - 0.61 Thousand/µL    Basophils Absolute 0.05 0.00 - 0.10 Thousands/µL       Physical Exam:    Constitutional:       General: She is not in acute distress.     Appearance: Normal appearance.   HENT:      Head: Normocephalic.      Nose: Nose normal.   Eyes:      General: No scleral icterus.  Cardiovascular:      Pulses: Normal pulses.   Pulmonary:      Effort: Pulmonary effort is normal. No respiratory distress.      Breath sounds: No wheezing.   Abdominal:      General: Bowel sounds are normal. There is no distension.      Palpations: Abdomen is soft.Fundus firm, below umbilicus     Tenderness: There is no abdominal tenderness. There is no guarding.   Musculoskeletal:      Cervical back: Neck supple.      Right lower leg: No edema.      Left lower leg: No edema.   Skin:     General: Skin is warm.      Capillary Refill: Capillary refill takes less than 2 seconds.   Neurological:      Mental Status: She is alert and oriented to person, place, and time.   Psychiatric:         Mood and Affect: Mood normal.      MEDS:     Current Facility-Administered Medications:     acetaminophen (TYLENOL) tablet 650 mg, Q4H PRN    albuterol (PROVENTIL HFA,VENTOLIN HFA) inhaler 2 puff, Q4H PRN    benzocaine-menthol-lanolin-aloe (DERMOPLAST) 20-0.5 % topical spray 1 Application, Q6H PRN    calcium carbonate (TUMS) chewable tablet  1,000 mg, Daily PRN    diphenhydrAMINE (BENADRYL) tablet 25 mg, Q6H PRN    docusate sodium (COLACE) capsule 100 mg, BID    hydrocortisone 1 % cream 1 Application, Daily PRN    ibuprofen (MOTRIN) tablet 600 mg, Q6H    ketorolac (TORADOL) injection 30 mg, Q6H PRN    ondansetron (ZOFRAN) injection 4 mg, Q8H PRN    oxyCODONE (ROXICODONE) IR tablet 5 mg, Q8H PRN    prenatal multivitamin tablet 1 tablet, Daily    witch hazel-glycerin (TUCKS) topical pad 1 Pad, Q4H PRN  Invasive Devices       Peripheral Intravenous Line  Duration             Peripheral IV 03/24/25 Dorsal (posterior);Left Hand 1 day

## 2025-03-25 NOTE — OB LABOR/OXYTOCIN SAFETY PROGRESS
Oxytocin Safety Progress Check Note - Sherrie Saucedo 30 y.o. female MRN: [id number]    Unit/Bed#: -01 Encounter: [id number]    Dose (hi-units/min) Oxytocin: 2 hi-units/min  Contraction Frequency (minutes):  (patient states every 2 minutes)  Contraction Intensity: Mild  Uterine Activity Characteristics: Irritability  Cervical Dilation: 4        Cervical Effacement: 60  Fetal Station: -3  Baseline Rate (FHR): 130 bpm  Fetal Heart Rate (FHT): 130 BPM  FHR Category: 1               Vital Signs:   Vitals:    03/24/25 2020   BP: 134/81   Pulse: 76   Resp: 17   Temp: 98.5 °F (36.9 °C)       Notes/comments:   Sherrie states contractions are beginning to become more intense.  Coping well  Continuous fetal monitoring  Continued labor support          Noreen Middleton CNM 3/24/2025 8:41 PM

## 2025-03-25 NOTE — PLAN OF CARE
Problem: BIRTH - VAGINAL/ SECTION  Goal: Fetal and maternal status remain reassuring during the birth process  Description: INTERVENTIONS:- Monitor vital signs- Monitor fetal heart rate- Monitor uterine activity- Monitor labor progression (vaginal delivery)- DVT prophylaxis- Antibiotic prophylaxis  Outcome: Completed  Goal: Emotionally satisfying birthing experience for mother/fetus  Description: Interventions:- Assess, plan, implement and evaluate the nursing care given to the patient in labor- Advocate the philosophy that each childbirth experience is a unique experience and support the family's chosen level of involvement and control during the labor process - Actively participate in both the patient's and family's teaching of the birth process- Consider cultural, [place] and age-specific factors and plan care for the patient in labor  Outcome: Completed

## 2025-03-25 NOTE — PLAN OF CARE
Problem: POSTPARTUM  Goal: Experiences normal postpartum course  Description: INTERVENTIONS:- Monitor maternal vital signs- Assess uterine involution and lochia  Outcome: Progressing  Goal: Appropriate maternal -  bonding  Description: INTERVENTIONS:- Identify family support- Assess for appropriate maternal/infant bonding -Encourage maternal/infant bonding opportunities- Referral to  or  as needed  Outcome: Progressing  Goal: Establishment of infant feeding pattern  Description: INTERVENTIONS:- Assess breast/bottle feeding- Refer to lactation as needed  Outcome: Progressing  Goal: Incision(s), wounds(s) or drain site(s) healing without S/S of infection  Description: INTERVENTIONS- Assess and document dressing, incision, wound bed, drain sites and surrounding tissue- Provide patient and family education- Perform skin care/dressing changes every   Outcome: Progressing     Problem: SAFETY ADULT  Goal: Patient will remain free of falls  Description: INTERVENTIONS:- Educate patient/family on patient safety including physical limitations- Instruct patient to call for assistance with activity - Consult OT/PT to assist with strengthening/mobility - Keep Call bell within reach- Keep bed low and locked with side rails adjusted as appropriate- Keep care items and personal belongings within reach- Initiate and maintain comfort rounds- Make Fall Risk Sign visible to staff- Offer Toileting every  Hours, in advance of need- Initiate/Maintain alarm- Obtain necessary fall risk management equipment: - Apply yellow socks and bracelet for high fall risk patients- Consider moving patient to room near nurses station  Outcome: Progressing  Goal: Maintain or return to baseline ADL function  Description: INTERVENTIONS:-  Assess patient's ability to carry out ADLs; assess patient's baseline for ADL function and identify physical deficits which impact ability to perform ADLs (bathing, care of mouth/teeth,  toileting, grooming, dressing, etc.)- Assess/evaluate cause of self-care deficits - Assess range of motion- Assess patient's mobility; develop plan if impaired- Assess patient's need for assistive devices and provide as appropriate- Encourage maximum independence but intervene and supervise when necessary- Involve family in performance of ADLs- Assess for home care needs following discharge - Consider OT consult to assist with ADL evaluation and planning for discharge- Provide patient education as appropriate  Outcome: Progressing  Goal: Maintains/Returns to pre admission functional level  Description: INTERVENTIONS:- Perform AM-PAC 6 Click Basic Mobility/ Daily Activity assessment daily.- Set and communicate daily mobility goal to care team and patient/family/caregiver. - Collaborate with rehabilitation services on mobility goals if consulted- Perform Range of Motion  times a day.- Reposition patient every  hours.- Dangle patient  times a day- Stand patient  times a day- Ambulate patient  times a day- Out of bed to chair  times a day - Out of bed for meals  times a day- Out of bed for toileting- Record patient progress and toleration of activity level   Outcome: Progressing     Problem: Knowledge Deficit  Goal: Patient/family/caregiver demonstrates understanding of disease process, treatment plan, medications, and discharge instructions  Description: Complete learning assessment and assess knowledge base.Interventions:- Provide teaching at level of understanding- Provide teaching via preferred learning methods  Outcome: Progressing     Problem: DISCHARGE PLANNING  Goal: Discharge to home or other facility with appropriate resources  Description: INTERVENTIONS:- Identify barriers to discharge w/patient and caregiver- Arrange for needed discharge resources and transportation as appropriate- Identify discharge learning needs (meds, wound care, etc.)- Arrange for interpretive services to assist at discharge as needed-  Refer to Case Management Department for coordinating discharge planning if the patient needs post-hospital services based on physician/advanced practitioner order or complex needs related to functional status, cognitive ability, or social support system  Outcome: Progressing

## 2025-03-25 NOTE — LACTATION NOTE
This note was copied from a baby's chart.  CONSULT - LACTATION  Baby Boy (Nahum Saucedo 1 days male MRN: [id number]    [facility] NURSERY Room / Bed: (N)/(N) Encounter: [id number]    Maternal Information     MOTHER:  Sherrie Saucedo  Maternal Age: 30 y.o.  OB History: # 1 - Date: 25, Sex: Male, Weight: 3050 g (6 lb 11.6 oz), GA: 39w2d, Type: Vaginal, Vacuum (Extractor), Apgar1: 8, Apgar5: 9, Living: Living, Birth Comments: None   Previous breast reduction surgery? No    Lactation history:   Has patient previously breast fed: No   How long had patient previously breast fed:     Previous breast feeding complications:       Past Surgical History:   Procedure Laterality Date    WISDOM TOOTH EXTRACTION      WRIST SURGERY         Birth information:  YOB: 2023   Time of birth: 11:49 PM   Sex: male   Delivery type: Vaginal, Vacuum (Extractor)   Birth Weight: 3050 g (6 lb 11.6 oz)   Percent of Weight Change: 0%     Gestational Age: 39w2d   [unfilled]    Reason for Consult:    Reason for Consult: Initial assessment (ext) - 20 min    Risk Factors:         Breast and nipple assessment:   Breasts/Nipples  Breastfeeding Progress: Not yet established (doing well per patient)    OB Lactation Tools:         Breast Pump:    Breast Pump  Pump: Personal (Has [employer] breast pump through insurance.)       Assessment: frequent gagging, spitty up during consult. No oral exam performed during this time.     Feeding assessment:  No latch assessed. Patient reports breastfeeding well, no discomfort.    Feeding recommendations:  breast feed on demand every 2-3 hours, watching for early feeding cues. At least 8-12 feedings in 24 hours.    Consulted with Sherrie for an initial visit. Patient states that baby is latching well, states that she feels a strong but comfortable tugging sensation when baby is sucking. Patient reports that there is mild pinching when baby latches  that discontinues after a minute. Patient reports to be offering both breasts during a feed.    Provided verbal guidance on latching and positioning baby at the breast.   Reviewed to ensure that the baby is always facing mother and that baby is alignment- ears, shoulders and hips. Encouraged patient to support her breast when attaching baby. Discussed aligning baby's nose to the nipple and allowing for the baby to open wide, with the baby's chin touching the breast first.   Discussed that initial latch can be painful at first but should wear off and should not be continuous throughout the feeding. Discussed that it feels like baby is continuously pinching at the breast, encourage to break the suction by putting pinky in the corner of the baby's mouth and relatching the baby.    Discussed different waking techniques to help a sleepy baby - checking baby's diaper, sitting baby upright and burping them. Placing baby skin to skin prior to feedings.   Discussed to offer both breasts during a feed, starting off with the breast baby last fed off of if baby takes both.     Discussed second day syndrome. Reviewed baby's belly size and cluster feeding. Discussed cluster feeding is a normal baby behavior and helps bring in a good milk supply and helps prevent excessive weight loss.     Discussed how to know the baby is feeding adequately at the breast:  -Baby feeding at least 8-12 times in 24 hours.  -Baby is deeply latched onto the breast, with lips flanged out. Actively sucking, swallows may not be audible.  -Mother feels a comfortable tugging sensation during a feeding.  -Baby is showing fullness cues, post feed- content, arms relaxed and/or sleeping.  -Baby is having adequate amounts of diapers and meeting goal diapers.  -Baby's stool is changing from black meconium, to greenish brown to yellow and seedy looking over the first week when exclusively providing breast milk.   -Baby's weight loss is within normal ranges.      Feeding plan:  Sherrie is encouraged to breastfeed on demand, every 2-3 hours or when baby showing early feeding cues. Reviewed to offer both breasts during a feed.  Discussed the importance of ensuring that baby feeds 8-12x in 24 hours and not waiting over 3 hours to feed. Reiterated to watch the baby for hunger and fullness cues.    Encouraged family to monitor baby's outputs, ensuring baby is reaching goal diapers. Discussed that soiled diapers transition by changing color from black meconium to yellow/seedy by day 5.    Discussed community resources for continued breastfeeding support once discharged home. Encouraged to contact with [facility] as needed.    Patient was encouraged to contact lactation for a latch assessment, continued support and/or questions that arise.      JUDE Greene 3/25/2025 1:11 PM

## 2025-03-25 NOTE — L&D DELIVERY NOTE
Delivery Note  Sherrie Saucedo; 30 y.o. female; MRN: [id number]  Unit/Bed#: -01; Encounter: [id number]    Delivery Date & Time: 11:49 PM, 3/24/2025    Time of delivery: 3/24/2025 11:49 PM    Preop Diagnoses:    (1) Pregnancy at 39w2d gestational age    (2) SROM on 3/24/25 @ 0845    (3) negative GBS    (4) fetal bradycardia     Postop diagnoses:    (1) 3062 g (6 lb 12 oz) male , apgars 8  / 9 , born 3/[birth year]/2025 11:49 PM    (2) VAVD    (3) induction of labor 2ndary to SROM    (4) negative GBS    (5) nuchal cord x 1    Procedure:  Delivery - Vaginal, Vacuum (Extractor)    Obstetrician/Certified Nurse Midwife:  LENA CAMPBELL  Anesthesia:  None  QBL:  Non-Surgical QBL (mL): 100 cc    Lab Results   Component Value Date    WBC 11.68 (H) 2025    HGB 12.6 2025    HCT 36.9 2025    MCV 86 2025     2025       Admission Hgb: 12.6  Admission Plt: 358    Specimens:    (1) Cord blood    (2) Cord gases    (3) Placenta    Findings:    (1) 3062 g (6 lb 12 oz) male , apgars 8  / 9     (2) Abnormal placenta, cord with 3 vessels    (3) Blood Gases  Umbilical Cord Venous Blood Gas:  Results from last 7 days   Lab Units 25  2353   PH COV  7.211   PCO2 COV mm HG 42.9   HCO3 COV mmol/L 16.8   BASE EXC COV mmol/L -10.7*   O2 CT CD VB mL/dL 19.0   O2 HGB, VENOUS CORD % 79.7     Umbilical Cord Arterial Blood Gas:  Results from last 7 days   Lab Units 25  2353   PH COA  7.094*   PCO2 COA  61.0*   PO2 COA mm HG 24.0   HCO3 COA mmol/L 18.3   BASE EXC COA mmol/L -12.4*   O2 CONTENT CORD ART ml/dl 9.0   O2 HGB, ARTERIAL CORD % 39.7       (4) Intact Perineum or laceration: Intact perineum  Vaginal laceration, hemostatic, no repair    Complications:  None.  Dispo:  Stable. Patient tolerated the procedure well and was recovering in labor & delivery.    Brief History of Labor Course:  Ms. Sherrie Saucedo is a 30 y.o.  at 39 weeks 4 days. She presented to labor and delivery on 3/24/25  secondary to SROM @ 0845 for clear fluid.  Cytotec and pitocin utilized for induction of labor.      Description of Procedure:    Sherrie Saucedo progressed to completely dilated @ 2330.  She labored   [facility]. Due to fetal bradycardia, Dr. Moreno was requested.  Difficulty monitoring FHTs.  Pitocin turned off.   Scalp electrode applied.  See Dr. Moreno's note.  Once pap off of vacuum, FHTs normalized and Sherrie continued to push effectively.  The fetal vtx delivered spontaneously.  Nuchal Cord x 1, loose, manually reduced .  The anterior shoulder delivered atraumatically with maternal expulsive forces and the assistance of downward traction. The posterior shoulder delivered with maternal expulsive forces and the assistance of upward traction.  The remainder of the fetus delivered spontaneously.     Upon delivery, the infant was transferred to maternal abdomen.   After 30-60 seconds the umbilical cord was then doubly clamped and cut.  The infant was transferred to preheated warmer for evaluation by NICU team.  Arterial and venous cord blood gases and cord blood was collected for analysis. These were promptly sent to the lab. In the immediate post-partum, 30 units of IV pitocin was administered, and the uterus was noted to contract down well with massage and pitocin. The placenta delivered spontaneously at 0002. The placenta was noted to be pale and frail, appeared intact. This was sent to Pathology.    The vagina, cervix, perineum, and rectum were inspected and there was noted to be a Intact perineum.   1st degree vaginal laceration, well approximated, hemostatic, no repair.      At the conclusion of the procedure, all needle, sponge, and instrument counts were noted to be correct, verified by chintan. Patient tolerated the procedure well and was allowed to recover in labor and delivery room with family and  before being transferred to the post-partum floor.     Ms.  Chin did well, and following the procedure was in stable condition.    Apgars: 8  and 9  at 1 and 5 minutes, respectively  Infant weight: 3062 g (6 lb 12 oz); 108    Happy Birthday, [place] !    Noreen Middleton CNM  3/25/2025  12:52 AM

## 2025-03-25 NOTE — PLAN OF CARE
Problem: BIRTH - VAGINAL/ SECTION  Goal: Fetal and maternal status remain reassuring during the birth process  Description: INTERVENTIONS:- Monitor vital signs- Monitor fetal heart rate- Monitor uterine activity- Monitor labor progression (vaginal delivery)- DVT prophylaxis- Antibiotic prophylaxis  Outcome: Completed  Goal: Emotionally satisfying birthing experience for mother/fetus  Description: Interventions:- Assess, plan, implement and evaluate the nursing care given to the patient in labor- Advocate the philosophy that each childbirth experience is a unique experience and support the family's chosen level of involvement and control during the labor process - Actively participate in both the patient's and family's teaching of the birth process- Consider cultural, Restoration and age-specific factors and plan care for the patient in labor  Outcome: Completed     Problem: POSTPARTUM  Goal: Experiences normal postpartum course  Description: INTERVENTIONS:- Monitor maternal vital signs- Assess uterine involution and lochia  Outcome: Progressing  Goal: Appropriate maternal -  bonding  Description: INTERVENTIONS:- Identify family support- Assess for appropriate maternal/infant bonding -Encourage maternal/infant bonding opportunities- Referral to  or  as needed  Outcome: Progressing  Goal: Establishment of infant feeding pattern  Description: INTERVENTIONS:- Assess breast/bottle feeding- Refer to lactation as needed  Outcome: Progressing  Goal: Incision(s), wounds(s) or drain site(s) healing without S/S of infection  Description: INTERVENTIONS- Assess and document dressing, incision, wound bed, drain sites and surrounding tissue-   Outcome: Progressing     Problem: SAFETY ADULT  Goal: Patient will remain free of falls  Description: INTERVENTIONS:- Educate patient/family on patient safety including physical limitations- Instruct patient to call for assistance with activity -  Consult OT/PT to assist with strengthening/mobility - Keep Call bell within reach- Keep bed low and locked with side rails adjusted as appropriate- Keep care items and personal belongings within reach- Initiate and maintain comfort rounds-   Outcome: Progressing  Goal: Maintain or return to baseline ADL function  Description: INTERVENTIONS:-  Assess patient's ability to carry out ADLs; assess patient's baseline for ADL function and identify physical deficits which impact ability to perform ADLs (bathing, care of mouth/teeth, toileting, grooming, dressing, etc.)- Assess/evaluate cause of self-care deficits - Assess range of motion- Assess patient's mobility; develop plan if impaired- Assess patient's need for assistive devices and provide as appropriate- Encourage maximum independence but intervene and supervise when necessary- Involve family in performance of ADLs- Assess for home care needs following discharge - Consider OT consult to assist with ADL evaluation and planning for discharge- Provide patient education as appropriate    Problem: Knowledge Deficit  Goal: Patient/family/caregiver demonstrates understanding of disease process, treatment plan, medications, and discharge instructions  Description: Complete learning assessment and assess knowledge base.Interventions:- Provide teaching at level of understanding- Provide teaching via preferred learning methods  Outcome: Progressing     Problem: DISCHARGE PLANNING  Goal: Discharge to home or other facility with appropriate resources  Description: INTERVENTIONS:- Identify barriers to discharge w/patient and caregiver- Arrange for needed discharge resources and transportation as appropriate- Identify discharge learning needs (meds, wound care, etc.)- Arrange for interpretive services to assist at discharge as needed- Refer to Case Management Department for coordinating discharge planning if the patient needs post-hospital services based on physician/advanced  practitioner order or complex needs related to functional status, cognitive ability, or social support system  Outcome: Progressing

## 2025-03-25 NOTE — PROGRESS NOTES
Immediately presented to patient room directly from OR for another patient case upon notification from RN that patient was having decelerations. Upon entering room, CNM in room with patient pushing and had just placed FSE given FHR decelerations. FHR down to the 80s; I examined patient and felt patient pushing effort was excellent with fetal position direct OA. Removed FSE and discussed vacuum application with patient briefly, patient consented. Vacuum applied x 1, significant descent in fetal station achieved with 3 pushes. Vacuum popped off. FHR then returned to normal range, and patient pushed again followed by successful vaginal delivery by GREG Middleton. Total vacuum application once with 1 pull.

## 2025-03-26 ENCOUNTER — TRANSITIONAL CARE MANAGEMENT (OUTPATIENT)
Dept: FAMILY MEDICINE CLINIC | Facility: CLINIC | Age: 30
End: 2025-03-26

## 2025-03-26 VITALS
TEMPERATURE: 97.5 F | HEIGHT: 62 IN | RESPIRATION RATE: 18 BRPM | OXYGEN SATURATION: 98 % | HEART RATE: 77 BPM | WEIGHT: 167 LBS | BODY MASS INDEX: 30.73 KG/M2 | DIASTOLIC BLOOD PRESSURE: 57 MMHG | SYSTOLIC BLOOD PRESSURE: 113 MMHG

## 2025-03-26 RX ORDER — IBUPROFEN 600 MG/1
600 TABLET, FILM COATED ORAL EVERY 6 HOURS
Qty: 90 TABLET | Refills: 0 | Status: SHIPPED | OUTPATIENT
Start: 2025-03-26

## 2025-03-26 RX ADMIN — PRENATAL VITAMINS-IRON FUMARATE 27 MG IRON-FOLIC ACID 0.8 MG TABLET 1 TABLET: at 08:29

## 2025-03-26 RX ADMIN — DOCUSATE SODIUM 100 MG: 100 CAPSULE, LIQUID FILLED ORAL at 08:29

## 2025-03-26 RX ADMIN — IBUPROFEN 600 MG: 600 TABLET, FILM COATED ORAL at 02:30

## 2025-03-26 RX ADMIN — IBUPROFEN 600 MG: 600 TABLET, FILM COATED ORAL at 08:29

## 2025-03-26 NOTE — DISCHARGE SUMMARY
ADMISSION  Patient of: [facility] [place]  Admission Date: 3/24/2025   Admitting Attending: Dr. Sintia SMITH DO   Admitting Diagnoses:   Patient Active Problem List   Diagnosis    12 weeks gestation of pregnancy    Encounter for supervision of normal first pregnancy in first trimester     (spontaneous vaginal delivery)       DELIVERY  Delivery Method: Vaginal, Vacuum (Extractor)   Delivery Date and Time: 3/24/2025 11:49 PM  Delivery Attending: Noreen Middleton     DISCHARGE  Discharge Date: 3/26/25  Discharge Attending: Dr. Ila Desouza  Discharge Diagnosis:   Same, Delivered     Clinical course: Admission to Delivery  Sherrie Saucedo is a 30 y.o.  who was admitted at 39w2d for  PROM and underwent induction of labor.     Reason for induction: Premature ROM.       Induction method:  , ,Misoprostol Premature ROM.     For pain control in labor, pt received no analgesia.   The labor course was complicated by fetal heart rate decelerations at time of pushing, so vacuum delivery performed.       Delivery  Route of Delivery: Vaginal, Vacuum (Extractor)       Anesthesia: None,   QBL: Non-Surgical QBL (mL): 100      QBL:        Delivery: Vaginal, Vacuum (Extractor) at 3/24/2025 11:49 PM   Laceration: Perineal: None Repaired?      Baby's Weight: 3050 g (6 lb 11.6 oz); 107.58    Apgar scores: 8  and 9  at 1 and 5 minutes, respectively    Clinical Course: Post-Delivery:  The post delivery course was unremarkable.    On the day of discharge, the patient was ambulating, voiding spontaneously, tolerating oral intake, and hemodynamically stable. She was able to reasonably perform all ADLs. She had appropriate bowel function. Pain was well-controlled. She was discharged home on postpartum/postop day # 2 without complications . Patient was instructed to follow up with her OB as an outpatient and was given appropriate warnings to call her provider with problems or concerns.    Pertinent lab findings included:    Blood type B+.     Last three Hgb values:  Lab Results   Component Value Date    HGB 11.0 (L) 03/25/2025    HGB 12.6 03/24/2025    HGB 11.6 02/01/2025        Problem-specific follow-up plans included the following:  none    Discharge med list:  Contraception:       Medication List      START taking these medications     ibuprofen 600 mg tablet; Commonly known as: MOTRIN; Take 1 tablet (600   mg total) by mouth every 6 (six) hours     CONTINUE taking these medications     ALBUTEROL IN   CHOLINE PO   PRENATAL VITAMIN PO       Condition at discharge:   good     Disposition:   Home    Planned Readmission:   No    Discharge Statement:  I have spent a total time of 30 minutes in caring for this patient on the day of the visit/encounter. >30 minutes of time was spent on: Impressions, Counseling / Coordination of care, Documenting in the medical record, Reviewing / ordering tests, medicine, procedures  , and Communicating with other healthcare professionals .

## 2025-03-26 NOTE — PLAN OF CARE
Problem: POSTPARTUM  Goal: Experiences normal postpartum course  Description: INTERVENTIONS:- Monitor maternal vital signs- Assess uterine involution and lochia  Outcome: Adequate for Discharge  Goal: Appropriate maternal -  bonding  Description: INTERVENTIONS:- Identify family support- Assess for appropriate maternal/infant bonding -Encourage maternal/infant bonding opportunities- Referral to  or  as needed  Outcome: Adequate for Discharge  Goal: Establishment of infant feeding pattern  Description: INTERVENTIONS:- Assess breast/bottle feeding- Refer to lactation as needed  Outcome: Adequate for Discharge     Problem: SAFETY ADULT  Goal: Patient will remain free of falls  Description: INTERVENTIONS:- Educate patient/family on patient safety including physical limitations- Instruct patient to call for assistance with activity - Consult OT/PT to assist with strengthening/mobility - Keep Call bell within reach- Keep bed low and locked with side rails adjusted as appropriate- Keep care items and personal belongings within reach- Initiate and maintain comfort rounds-   Outcome: Adequate for Discharge  Goal: Maintain or return to baseline ADL function  Description: INTERVENTIONS:-  Assess patient's ability to carry out ADLs; assess patient's baseline for ADL function and identify physical deficits which impact ability to perform ADLs (bathing, care of mouth/teeth, toileting, grooming, dressing, etc.)- Assess/evaluate cause of self-care deficits - Assess range of motion- Assess patient's mobility; develop plan if impaired- Assess patient's need for assistive devices and provide as appropriate- Encourage maximum independence but intervene and supervise when necessary- Involve family in performance of ADLs- Assess for home care needs following discharge - Consider OT consult to assist with ADL evaluation and planning for discharge- Provide patient education as appropriate  Outcome: Adequate  for Discharge  Goal: Maintains/Returns to pre admission functional level  Description: INTERVENTIONS:- Perform AM-PAC 6 Click Basic Mobility/ Daily Activity assessment daily.- Set and communicate daily mobility goal to care team and patient/family/caregiver. -   Outcome: Adequate for Discharge     Problem: Knowledge Deficit  Goal: Patient/family/caregiver demonstrates understanding of disease process, treatment plan, medications, and discharge instructions  Description: Complete learning assessment and assess knowledge base.Interventions:- Provide teaching at level of understanding- Provide teaching via preferred learning methods  Outcome: Adequate for Discharge     Problem: DISCHARGE PLANNING  Goal: Discharge to home or other facility with appropriate resources  Description: INTERVENTIONS:- Identify barriers to discharge w/patient and caregiver- Arrange for needed discharge resources and transportation as appropriate- Identify discharge learning needs (meds, wound care, etc.)- Arrange for interpretive services to assist at discharge as needed- Refer to Case Management Department for coordinating discharge planning if the patient needs post-hospital services based on physician/advanced practitioner order or complex needs related to functional status, cognitive ability, or social support system  Outcome: Adequate for Discharge

## 2025-03-26 NOTE — PROGRESS NOTES
Progress Note - OB/GYN  Post-Partum Physician Note   Sherrie Saucedo 30 y.o. female MRN: [id number]  Unit/Bed#:  204-01 Encounter: [id number]    Assessment:  30 y.o. year-old , postpartum day #2 s/p VAVD of healthy baby boy weighing 3050g with Apgars 8, 9.    Assessment & Plan   (spontaneous vaginal delivery)  - routine postpartum care  - breastfeeding  - Hgb 12.6 > 11.0  - discharge home today  - Discussed reasons to call office after discharge, including but not limited to fever, shortness of breath, chest pain, severe headache, visual change, abdominal pain, heavy vaginal bleeding, persistent sadness or depression. Patient verbalized understanding.        _________________________________    Subjective:   No acute events overnight. Denies HA, visual changes, epigastric pain. Denies shortness of breath or chest pain. Ambulating and voiding without difficulty.  Good urine output. Minimal lochia.      Objective:   Vitals:    25 1500 25 1900 25 2300 25 0700   BP: 121/79 136/77 118/76 113/57   BP Location: Left arm Left arm Left arm Left arm   Pulse: 98 102 96 77   Resp: 18 20 18 18   Temp: 98.2 °F (36.8 °C) 97.8 °F (36.6 °C) 98.4 °F (36.9 °C) 97.5 °F (36.4 °C)   TempSrc: Temporal Oral Oral Temporal   SpO2: 98% 100%  98%   Weight:       Height:         No intake or output data in the 24 hours ending 25 1145    Physical Exam:  General: AOx3, NAD  Lungs: CTAB  CV: RRR  Abdomen: soft, fundus firm below umbilicus, nontender  Extremities: nontender without edema bilaterally      Labs/Tests:   Lab Results   Component Value Date/Time    HGB 11.0 (L) 2025 06:10 AM    HGB 12.6 2025 11:42 AM     2025 06:10 AM     2025 11:42 AM    WBC 22.49 (H) 2025 06:10 AM    WBC 11.68 (H) 2025 11:42 AM        Brief OB Lab review:  ABO Grouping   Date Value Ref Range Status   2025 B  Final      Rh Factor   Date Value Ref Range Status   2025  "Positive  Final    No results found for: \"ANTIBODYSCR\"  No results found for: \"RUBM\"    MEDS:     Current Facility-Administered Medications:     acetaminophen (TYLENOL) tablet 650 mg, Q4H PRN    albuterol (PROVENTIL HFA,VENTOLIN HFA) inhaler 2 puff, Q4H PRN    benzocaine-menthol-lanolin-aloe (DERMOPLAST) 20-0.5 % topical spray 1 Application, Q6H PRN    calcium carbonate (TUMS) chewable tablet 1,000 mg, Daily PRN    diphenhydrAMINE (BENADRYL) tablet 25 mg, Q6H PRN    docusate sodium (COLACE) capsule 100 mg, BID    hydrocortisone 1 % cream 1 Application, Daily PRN    ibuprofen (MOTRIN) tablet 600 mg, Q6H    ketorolac (TORADOL) injection 30 mg, Q6H PRN    ondansetron (ZOFRAN) injection 4 mg, Q8H PRN    oxyCODONE (ROXICODONE) IR tablet 5 mg, Q8H PRN    prenatal multivitamin tablet 1 tablet, Daily    witch hazel-glycerin (TUCKS) topical pad 1 Pad, Q4H PRN  Invasive Devices       Peripheral Intravenous Line  Duration             Peripheral IV 03/24/25 Dorsal (posterior);Left Hand 2 days                      Ila Desouza MD  3/26/2025 11:45 AM             "

## 2025-03-26 NOTE — ASSESSMENT & PLAN NOTE
- routine postpartum care  - breastfeeding  - Hgb 12.6 > 11.0  - discharge home today  - Discussed reasons to call office after discharge, including but not limited to fever, shortness of breath, chest pain, severe headache, visual change, abdominal pain, heavy vaginal bleeding, persistent sadness or depression. Patient verbalized understanding.

## 2025-03-27 ENCOUNTER — TELEPHONE (OUTPATIENT)
Dept: OBGYN CLINIC | Facility: CLINIC | Age: 30
End: 2025-03-27

## 2025-03-27 NOTE — UTILIZATION REVIEW
"    MOTHER AND BABY DISCHARGED     NOTIFICATION OF INPATIENT ADMISSION   MATERNITY/DELIVERY AUTHORIZATION REQUEST   SERVICING FACILITY:   [facility] - L&D, , NICU  [address] [place] [address]  Tax ID: [id number]  NPI: [id number] ATTENDING PROVIDER:  Attending Name and NPI#: Sintia SMITH Do [[id number]]  Address: [address] [place] [address]  Phone: [phone]     ADMISSION INFORMATION:  Place of Service: Inpatient [facility]  Place of Service Code: 21  Inpatient Admission Date/Time: 3/24/25 11:23 AM  Discharge Date/Time: 3/26/2025  3:00 PM  Admitting Diagnosis Code/Description:  No admission diagnoses are documented for this encounter.     Mother: Sherrie Saucedo 1995 Estimated Date of Delivery: 3/29/25  Delivering clinician: Noreen Middleton   OB History          1    Para   1    Term   1       0    AB   0    Living   1         SAB        IAB        Ectopic        Multiple   0    Live Births   1                Name & MRN:   Information for the patient's :  Lewis, Baby Boy (Sherrie) [[id number]]    Delivery Information:  Sex: male  Delivered 3/24/2025 11:49 PM by Vaginal, Vacuum (Extractor); Gestational Age: 39w2d     Measurements:  Weight: 6 lb 11.6 oz (3050 g);  Height: 19\"    APGAR 1 minute 5 minutes 10 minutes   Totals: 8 9       UTILIZATION REVIEW CONTACT:  Aminah Longoria Utilization   Network Utilization Review Department  Phone: [phone]  Fax [phone]  Email: [email or web address]  Contact for approvals/pending authorizations, clinical reviews, and discharge.     PHYSICIAN ADVISORY SERVICES:  Medical Necessity Denial & Tion-je-Qfmu Review  Phone: [phone]  Fax: [phone]  Email: [email or web address]     DISCHARGE SUPPORT TEAM:  For Patients Discharge Needs & Updates  Phone: [phone] opt. 2 Fax: " [phone]  Email: [email or web address]     NOTIFICATION OF ADMISSION DISCHARGE   This is a Notification of Discharge from [facility]. Please be advised that this patient has been discharge from our facility. Below you will find the admission and discharge date and time including the patient’s disposition.   UTILIZATION REVIEW CONTACT:  Utilization Review Assistants  Network Utilization Review Department  Phone: [phone] x carefully listen to the prompts. All voicemails are confidential.  Email: [email or web address]     ADMISSION INFORMATION  PRESENTATION DATE: 3/24/2025 10:36 AM  OBERVATION ADMISSION DATE: N/A  INPATIENT ADMISSION DATE: 3/24/25 11:23 AM   DISCHARGE DATE: 3/26/2025  3:00 PM   DISPOSITION:Home/Self Care    [facility] Utilization Review Department  ATTENTION: Please call with any questions or concerns to [phone] and carefully listen to the prompts so that you are directed to the right person. All voicemails are confidential.   For Discharge needs, contact Care Management DC Support Team at [phone] opt. 2  Send all requests for admission clinical reviews, approved or denied determinations and any other requests to dedicated fax number below belonging to the campus where the patient is receiving treatment. List of dedicated fax numbers for the Facilities:  FACILITY NAME UR FAX NUMBER   ADMISSION DENIALS (Administrative/Medical Necessity) [phone]   DISCHARGE SUPPORT TEAM ([facility]) [phone]   PARENT CHILD HEALTH (Maternity/NICU/Pediatrics) [phone]   [facility] [phone]   [facility] [phone]   [facility] [phone]   [facility] [phone]   [facility] [phone]   [facility] [phone]   [facility] [phone]   [facility]  [facility] [phone]   [facility] [phone]   [facility] [phone]   [facility] [phone]   [facility] [phone]

## 2025-03-27 NOTE — TELEPHONE ENCOUNTER
POSTPARTUM PHONE CALL ASSESSMENT    Date of Delivery: 3/24/25    Delivering Provider:  KAYY Middleton CNM    Mode:     Delivery Notes/Complications: No complications    How are you doing physically/emotionally? Patient states she is doing well.     Breastfeeding/Formula/Both? Breastfeeding    Do you still have bleeding? yes  If so, how much/how severe? light vaginal bleeding    Do you have any pain? no  If so, how much/how severe? no pain    Regular BMs/Urination? yes    Do you have any other questions or concerns for us or your provider? no     Have you scheduled the pediatrician appointment with pediatrician? Yes - tomorrow 3/28/25    Do you have a postpartum visit scheduled? yes  Date scheduled: 25 Provider:  Dr Landrum

## 2025-03-28 PROCEDURE — 88307 TISSUE EXAM BY PATHOLOGIST: CPT | Performed by: PATHOLOGY

## 2025-04-14 ENCOUNTER — POSTPARTUM VISIT (OUTPATIENT)
Age: 30
End: 2025-04-14

## 2025-04-14 VITALS
WEIGHT: 147.4 LBS | DIASTOLIC BLOOD PRESSURE: 60 MMHG | SYSTOLIC BLOOD PRESSURE: 122 MMHG | BODY MASS INDEX: 27.12 KG/M2 | HEIGHT: 62 IN

## 2025-04-14 PROBLEM — Z34.01 ENCOUNTER FOR SUPERVISION OF NORMAL FIRST PREGNANCY IN FIRST TRIMESTER: Status: RESOLVED | Noted: 2024-09-20 | Resolved: 2025-04-14

## 2025-04-14 PROBLEM — Z3A.12 12 WEEKS GESTATION OF PREGNANCY: Status: RESOLVED | Noted: 2024-09-17 | Resolved: 2025-04-14

## 2025-04-14 PROCEDURE — 99024 POSTOP FOLLOW-UP VISIT: CPT | Performed by: OBSTETRICS & GYNECOLOGY

## 2025-04-14 NOTE — PROGRESS NOTES
"OB POSTPARTUM VISIT PROGRESS NOTE  Date of Encounter: 2025    Sherrie Saucedo    : 1995  (30 y.o.)  MR: [id number]    Subjective   Sherrie is in for her postpartum visit. She is now . She delivered by normal spontaneous vaginal delivery. She's generally doing well, denies current pain or bleeding issues, and has no significant depression issues. She is breast feeding exclusively. We discussed all appropriate contraceptive options and she chooses condoms for now.      Objective   EXAM:    PDS 0    VITALS: Blood pressure 122/60, height 5' 2\" (1.575 m), weight 66.9 kg (147 lb 6.4 oz), last menstrual period 2024, currently breastfeeding.  BMI: Body mass index is 26.96 kg/m².    Physical Exam  Constitutional:       Appearance: Normal appearance.   HENT:      Head: Normocephalic.   Cardiovascular:      Rate and Rhythm: Normal rate and regular rhythm.   Pulmonary:      Effort: Pulmonary effort is normal.   Abdominal:      General: There is no distension.   Musculoskeletal:         General: No swelling.   Neurological:      General: No focal deficit present.      Mental Status: She is alert and oriented to person, place, and time.   Skin:     General: Skin is warm and dry.   Psychiatric:         Mood and Affect: Mood normal.         Behavior: Behavior normal.   Vitals reviewed.        Assessment & Plan   Diagnoses and all orders for this visit:    Postpartum exam    RTO 3 months for annual    Rachel Landrum MD    "

## 2025-04-23 ENCOUNTER — OFFICE VISIT (OUTPATIENT)
Dept: POSTPARTUM | Facility: CLINIC | Age: 30
End: 2025-04-23
Payer: COMMERCIAL

## 2025-04-23 PROCEDURE — 99404 PREV MED CNSL INDIV APPRX 60: CPT | Performed by: PEDIATRICS

## 2025-04-23 NOTE — PATIENT INSTRUCTIONS
-Great meeting with you your sweet baby boy!   -Feed him on demand. Watch for signs throughout the feeding that baby is effectively removing milk. You will feel strong tugging, hear swallowing and will feel your breasts get softer after nursing.   -No need to pump if baby is latching and feeding well at the breast on demand.   -Pump only as needed for missed feedings at the breast or for uncomfortable engorgement, utilize flange fit and settings that are comfortable for you, pumping should not be painful!   -Healing techniques for nipples : nipple cream of choice and airflow, or apply and cover with a piece of wax or parchment paper over top in between feedings.   - Storing and Thawing Breast Milk  [facility] Breastfeeding Support ([place].gov)    -Please call or scheduled a follow up appointment with lactation as needed for questions or concerns you may have.

## 2025-04-23 NOTE — PROGRESS NOTES
INITIAL BREAST FEEDING EVALUATION    Informant/Relationship: Sherrie (mom/self)     Discussion of General Lactation Issues: Sherrie is here to ensure she is breastfeeding correctly. She is noticing sucking blisters and he doesn't always have the deepest latch. She has pain every once in a while on the left side.     Infant is 4 weeks and 2 days old today.        History:  Fertility Problem:no  Breast changes:yes - slight enlargement and tenderness   :  water broke on its own, augmented labor. She was in labor for about 16 hours, pushed for 20 min, one pop off with vacuum.,   Full term:yes - 39 and 2    labor:no  First nursing/attempt < 1 hour after birth:yes - this was not a great latch per Mom, it was difficult to get him to latch   Skin to skin following delivery:yes - after breathing was under control   Breast changes after delivery:yes - 3-4 days   Rooming in (infant in room with mother with exception of procedures, eg. Circumcision: yes - entire stay   Blood sugar issues:no  NICU stay:no  Jaundice:no  Phototherapy:no  Supplement given: (list supplement and method used as well as reason(s):no    Past Medical History:   Diagnosis Date    Abnormal Pap smear of cervix 3/29/21    Pap: 3/15/21 ASCUS / HPV OTHER-positive, 16/18 negative    Asthma     as teen    HPV (human papilloma virus) infection     Pap: 3/15/21 ASCUS/HPV HR+  (16/18 neg)    Varicella     had both chicken pox ans vaccine         Current Outpatient Medications:     ALBUTEROL IN, , Disp: , Rfl:     CHOLINE PO, Take by mouth (Patient not taking: Reported on 11/15/2024), Disp: , Rfl:     ibuprofen (MOTRIN) 600 mg tablet, Take 1 tablet (600 mg total) by mouth every 6 (six) hours, Disp: 90 tablet, Rfl: 0    Prenatal Vit-Fe Fumarate-FA (PRENATAL VITAMIN PO), Take by mouth, Disp: , Rfl:     No Known Allergies    Social History     Substance and Sexual Activity   Drug Use Never       Social History     Interval Breastfeeding  History:    Frequency of breast feeding: every 2-3 hours, sometimes sooner   Does mother feel breastfeeding is effective: Yes  Does infant appear satisfied after nursing:Yes  Stooling pattern normal: Yes  Urinating frequently:Yes  Using shield or shells: No    Alternative/Artificial Feedings:   Bottle: Yes, [employer] glass bottles, did okay with this   Cup: No  Syringe/Finger: No           Formula Type:  no                     Amount: n/a            Breast Milk:                      Amount: 3 oz             Frequency Q just attempted   Elimination Problems: No      Equipment:  Nipple Shield             Type: no             Size: n/a             Frequency of Use: n/a  Pump            Type: Momcozy wearable, manual pump as well             Frequency of Use: 1 x per day, not always pumping daily.   Shells            Type: n/a            Frequency of use: n/a    Equipment Problems: no    Mom:  Breast: Normal, full feeling glandular tissue throughout   Nipple Assessment in General: Normal: elongated/eraser, no discoloration and no damage noted.  Mother's Awareness of Feeding Cues                 Recognizes: Yes                  Verbalizes: Yes  Support System: good support, FOB   History of Breastfeeding: first time breastfeeding   Changes/Stressors/Violence: First time Mom, new to breastfeeding. Unsure if he is latching well each time.   Concerns/Goals: Sherrie desires to breastfeed at least 1 year. Plans to pump to provide breast milk via bottle when she returns to work.     Problems with Mom: none     Physical Exam  Constitutional:       Appearance: Normal appearance.   HENT:      Head: Normocephalic.   Pulmonary:      Effort: Pulmonary effort is normal.   Musculoskeletal:         General: Normal range of motion.      Cervical back: Normal range of motion.   Neurological:      General: No focal deficit present.      Mental Status: She is alert and oriented to person, place, and time.   Skin:     General: Skin is  warm and dry.      Capillary Refill: Capillary refill takes less than 2 seconds.   Psychiatric:         Mood and Affect: Mood normal.         Behavior: Behavior normal.         Thought Content: Thought content normal.         Judgment: Judgment normal.         Infant:  Behaviors: Sleepy  Color: Pink  Birth weight: 3050 g   Current weight: 3900 g     Problems with infant: none       General Appearance:  Alert, active, no distress                             Head:  Normocephalic, AFOF, sutures opposed                             Eyes:  Conjunctiva clear, no drainage                              Ears:  Normally placed, no anomolies                             Nose:  Septum intact, no drainage or erythema                           Mouth:  No lesions. Tongue is flat when crying, good lateralization to the left, poorly to the right. Palate is WNL. He is not willing to suck on my finger. Manual lift is without resistance, rounded and frenulum connects to the floor of the mouth and mid tongue. On the breast he appears to have an effective suck with rocker jaw motion.                     Neck:  Supple, symmetrical, trachea midline                 Respiratory:  No grunting, flaring, retractions, breath sounds clear and equal            Cardiovascular:  Regular rate and rhythm. No murmur. Adequate perfusion/capillary refill. Femoral pulse present                    Abdomen:   Soft, non-tender, no masses, bowel sounds present, no HSM             Genitourinary:  Normal male, testes descended, no discharge, swelling, or pain, anus patent                          Spine:   No abnormalities noted        Musculoskeletal:  Full range of motion          Skin/Hair/Nails:   Skin warm, dry, and intact, no rashes or abnormal dyspigmentation or lesions                Neurologic:   No abnormal movement, tone appropriate for gestational age    [place] Latch:  Efficiency:               Lips Flanged: Yes              Depth of latch: wide                Audible Swallow: Yes              Visible Milk: Yes              Wide Open/ Asymmetrical: Yes              Suck Swallow Cycle: Breathing: yes, Coordinated: yes  Nipple Assessment after latch: Normal: elongated/eraser, no discoloration and no damage noted.  Latch Problems: None today, he gapes well and attachment appears wide. Started on the left breast and Sherrie reports increased comfort from baseline with positioning adjustments.     Position:  Infant's Ergonomics/Body               Body Alignment: Yes               Head Supported: Yes               Close to Mom's body/ Lifted/ Supported: Yes               Mom's Ergonomics/Body: Yes                           Supported: Yes                           Sitting Back: Yes                           Brings Baby to her breast: Yes  Positioning Problems: Reviewed BN hold and Sherrie returns this demonstration well.         Education:  Reviewed Latch: importance of deep latch without pain.   Reviewed Positioning for Dyad: proper alignment and head angle when positioning at the breast   Reviewed Frequency/Supply & Demand: offer the breast at each feeding, pump if baby is not latching and effective transferring milk.   Reviewed Infant:Cues and varied States of Awareness: watch for hunger cues, feed on demand. If baby seems satisfied at the breast (calm, relaxed sleeping, breasts are softer) no need to pump or supplement   Reviewed Infant Elimination: goal of 6+ wets and 2-3 stools per day   Reviewed Alternative/Artificial Feedings: paced bottle feeding technique demonstrated  Reviewed Mom/Breast care: gentle handling of the breast at all times, discussed lymphatic drainage and reverse pressure softening, as well as tips for healing sore nipples.    Reviewed Equipment: Hand pump and electric pump general guidance, Discussed proper flange fit, how to measure        Plan:      Reassurance provided that baby is growing well at this time. Cont with positioning adjustments and  watch for signs of effective feeding. Pump only if wanting to replace feeding at the breast with bottle feeding or for uncomfortable engorgement. Gentle handling of the breast at all times to preserve integrity.  Contact [facility] for breastfeeding support as needed or ongoing concerns with latching comfort and milk transfer.     I have spent 60 minutes with Patient and family today in which greater than 50% of this time was spent in counseling/coordination of care regarding Patient and family education.

## 2025-04-27 NOTE — PROGRESS NOTES
I have reviewed the notes, assessments, and/or procedures performed by Mary Nino RN, IBCLC, I concur with her/his documentation of Sherrie Marin MD 04/27/25

## 2025-05-05 ENCOUNTER — TELEPHONE (OUTPATIENT)
Age: 30
End: 2025-05-05

## 2025-08-08 ENCOUNTER — ANNUAL EXAM (OUTPATIENT)
Age: 30
End: 2025-08-08
Payer: COMMERCIAL

## 2025-08-08 VITALS
DIASTOLIC BLOOD PRESSURE: 62 MMHG | SYSTOLIC BLOOD PRESSURE: 122 MMHG | WEIGHT: 134.8 LBS | HEIGHT: 62 IN | BODY MASS INDEX: 24.8 KG/M2

## 2025-08-08 DIAGNOSIS — Z12.4 SCREENING FOR CERVICAL CANCER: ICD-10-CM

## 2025-08-08 DIAGNOSIS — Z01.419 ENCOUNTER FOR ANNUAL ROUTINE GYNECOLOGICAL EXAMINATION: Primary | ICD-10-CM

## 2025-08-08 PROCEDURE — S0612 ANNUAL GYNECOLOGICAL EXAMINA: HCPCS | Performed by: OBSTETRICS & GYNECOLOGY

## 2025-08-08 RX ORDER — DIPHENOXYLATE HYDROCHLORIDE AND ATROPINE SULFATE 2.5; .025 MG/1; MG/1
1 TABLET ORAL DAILY
COMMUNITY

## 2025-08-15 LAB
LAB AP GYN PRIMARY INTERPRETATION: NORMAL
Lab: NORMAL